# Patient Record
Sex: MALE | Race: WHITE | NOT HISPANIC OR LATINO | Employment: FULL TIME | URBAN - METROPOLITAN AREA
[De-identification: names, ages, dates, MRNs, and addresses within clinical notes are randomized per-mention and may not be internally consistent; named-entity substitution may affect disease eponyms.]

---

## 2017-05-26 ENCOUNTER — NON-PROVIDER VISIT (OUTPATIENT)
Dept: URGENT CARE | Facility: CLINIC | Age: 32
End: 2017-05-26

## 2017-05-26 ENCOUNTER — OFFICE VISIT (OUTPATIENT)
Dept: URGENT CARE | Facility: CLINIC | Age: 32
End: 2017-05-26

## 2017-05-26 DIAGNOSIS — Z01.89 RESPIRATORY CLEARANCE EXAMINATION, ENCOUNTER FOR: ICD-10-CM

## 2017-05-26 PROCEDURE — 94375 RESPIRATORY FLOW VOLUME LOOP: CPT | Performed by: PHYSICIAN ASSISTANT

## 2017-05-26 NOTE — MR AVS SNAPSHOT
Jose Luong   2017 1:15 PM   Appointment   MRN: [id number]    Department:  [facility]   Dept Phone:  [phone]    Description:  Male : 1985   Provider:  Ashley Catalan PA-C           Allergies as of 2017     No Known Allergies      Vital Signs     Smoking Status                   Never Smoker            Basic Information     Date Of Birth Sex Race Ethnicity Preferred Language    1985 Male White Non- English      Health Maintenance        Date Due Completion Dates    IMM DTaP/Tdap/Td Vaccine (1 - Tdap) 2005 ---            Current Immunizations     No immunizations on file.      Below and/or attached are the medications your provider expects you to take. Review all of your home medications and newly ordered medications with your provider and/or pharmacist. Follow medication instructions as directed by your provider and/or pharmacist. Please keep your medication list with you and share with your provider. Update the information when medications are discontinued, doses are changed, or new medications (including over-the-counter products) are added; and carry medication information at all times in the event of emergency situations     Allergies:  No Known Allergies          Medications  Valid as of: May 26, 2017 -  1:21 PM    Generic Name Brand Name Tablet Size Instructions for use    Cyclobenzaprine HCl (Tab) FLEXERIL 10 MG Take 1 Tab by mouth 3 times a day as needed for Mild Pain.        Hydrocodone-Acetaminophen (Tab) NORCO 5-325 MG Take 1-2 Tabs by mouth every 6 hours as needed.        Hydrocodone-Acetaminophen (Tab) NORCO 5-325 MG Take 1-2 Tabs by mouth every 6 hours as needed.        Ondansetron (TABLET DISPERSIBLE) ZOFRAN ODT 4 MG Take 1 Tab by mouth every 8 hours as needed for Nausea/Vomiting.        Sertraline HCl (Tab) ZOLOFT 50 MG Take 1 Tab by mouth every day.        .                 Medicines prescribed today were sent to:     [facility]  [id number] - [place], [place] - [address]    [address] [place] [address]    Phone: [phone] Fax: [phone]    Open 24 Hours?: No      Medication refill instructions:       If your prescription bottle indicates you have medication refills left, it is not necessary to call your provider’s office. Please contact your pharmacy and they will refill your medication.    If your prescription bottle indicates you do not have any refills left, you may request refills at any time through one of the following ways: The online Palmer Hargreaves system (except Urgent Care), by calling your provider’s office, or by asking your pharmacy to contact your provider’s office with a refill request. Medication refills are processed only during regular business hours and may not be available until the next business day. Your provider may request additional information or to have a follow-up visit with you prior to refilling your medication.   *Please Note: Medication refills are assigned a new Rx number when refilled electronically. Your pharmacy may indicate that no refills were authorized even though a new prescription for the same medication is available at the pharmacy. Please request the medicine by name with the pharmacy before contacting your provider for a refill.           Palmer Hargreaves Access Code: [email or web address]  Expires: 6/25/2017  1:21 PM    Your email address is not on file at [facility].  Email Addresses are required for you to sign up for [employer], please contact [phone] to verify your personal information and to provide your email address prior to attempting to register for [employer].    Jose Luong  [address] [place] [address]    Palmer Hargreaves  A secure, online tool to manage your health information     [facility]’s Palmer Hargreaves® is a secure, online tool that connects you to your personalized health information from the privacy of your home -- day or night - making it very easy for you to manage your healthcare. Once the  activation process is completed, you can even access your medical information using the InvestGlass fransisca, which is available for free in the Apple Fransisca store or Google Play store.     To learn more about InvestGlass, visit [email or web address]    There are two levels of access available (as shown below):   My Chart Features  [facility]  Urgent  Care Non-[facility] Doctor   Email your healthcare team securely and privately 24/7 X X X    Manage appointments: schedule your next appointment; view details of past/upcoming appointments X      Request prescription refills. X      View recent personal medical records, including lab and immunizations X X X X   View health record, including health history, allergies, medications X X X X   Read reports about your outpatient visits, procedures, consult and ER notes X X X X   See your discharge summary, which is a recap of your hospital and/or ER visit that includes your diagnosis, lab results, and care plan X X  X     How to register for InvestGlass:  Once your e-mail address has been verified, follow the following steps to sign up for InvestGlass.     1. Go to  [email or web address]  2. Click on the Sign Up Now box, which takes you to the New Member Sign Up page. You will need to provide the following information:  a. Enter your InvestGlass Access Code exactly as it appears at the top of this page. (You will not need to use this code after you’ve completed the sign-up process. If you do not sign up before the expiration date, you must request a new code.)   b. Enter your date of birth.   c. Enter your home email address.   d. Click Submit, and follow the next screen’s instructions.  3. Create a Spoket ID. This will be your InvestGlass login ID and cannot be changed, so think of one that is secure and easy to remember.  4. Create a InvestGlass password. You can change your password at any time.  5. Enter your Password Reset Question and Answer.  This can be used at a later time if you forget your password.   6. Enter your e-mail address. This allows you to receive e-mail notifications when new information is available in [employer].  7. Click Sign Up. You can now view your health information.    For assistance activating your [employer] account, call [phone]

## 2017-08-07 ENCOUNTER — OFFICE VISIT (OUTPATIENT)
Dept: INTERNAL MEDICINE | Facility: MEDICAL CENTER | Age: 32
End: 2017-08-07
Payer: COMMERCIAL

## 2017-08-07 VITALS
SYSTOLIC BLOOD PRESSURE: 118 MMHG | DIASTOLIC BLOOD PRESSURE: 80 MMHG | TEMPERATURE: 97.2 F | WEIGHT: 216 LBS | BODY MASS INDEX: 31.99 KG/M2 | HEIGHT: 69 IN | HEART RATE: 77 BPM | OXYGEN SATURATION: 95 %

## 2017-08-07 DIAGNOSIS — Z13.1 SCREENING FOR DIABETES MELLITUS (DM): ICD-10-CM

## 2017-08-07 DIAGNOSIS — E66.9 OBESITY (BMI 30-39.9): ICD-10-CM

## 2017-08-07 DIAGNOSIS — Z83.3 FAMILY HISTORY OF DIABETES MELLITUS (DM): ICD-10-CM

## 2017-08-07 DIAGNOSIS — F32.A DEPRESSION, UNSPECIFIED DEPRESSION TYPE: ICD-10-CM

## 2017-08-07 PROCEDURE — 99204 OFFICE O/P NEW MOD 45 MIN: CPT | Mod: GC | Performed by: INTERNAL MEDICINE

## 2017-08-07 RX ORDER — FLUOXETINE HYDROCHLORIDE 20 MG/1
20 CAPSULE ORAL DAILY
Qty: 30 CAP | Refills: 2 | Status: SHIPPED | OUTPATIENT
Start: 2017-08-07 | End: 2017-11-06 | Stop reason: SDUPTHER

## 2017-08-07 ASSESSMENT — PATIENT HEALTH QUESTIONNAIRE - PHQ9
SUM OF ALL RESPONSES TO PHQ QUESTIONS 1-9: 20
5. POOR APPETITE OR OVEREATING: 1 - SEVERAL DAYS
CLINICAL INTERPRETATION OF PHQ2 SCORE: 5

## 2017-08-07 NOTE — PROGRESS NOTES
"New Patient to Establish    Reason to establish: New patient to establish    CC: establish care and depression.     HPI: Patient notes new depression for about 2 years.  Prior to the onset, he had both his grand-parents pass away shortly before this (and they were the ones to raise him).  He does not note any SI at present, and notes that he wants to stick around for his kid (a 20 month old daughter).  He is 6/8 + for SIGECAPS, with self-acknowledged depression.  He states that he always just stays at home, but then looses interest at those activities too (and gets bored easily),  He feels he is a failure / not a success, he is tired all the time, has poor sleep quality (\"horrible\" low-quality sleep, easily awoken / tosses-and-turns), has poor focus / concentration (ongoing for several years, possibly decade), and he typically gains weight and eats a lot (but recently on a diet, and loss of 15 lbs, intentionally - making effort on diet).  He denies current SI, and is \"able to get-up-and-go with new job,\" but notes that it has previously been a problem (in the past).     (no insomnia, no risk taking, no manic behavior).  His PHQ 2 score is 5, and his PHQ 9 score is 20.    He has previously had depresison in his early 20 years (aver 10 years ago).  He does not remember any cause or trigger to this, but does confirm past issues with SI.  Before, he had tried a \"name-brand\" medication (he doesn't remember which), which appeared to help, but that he couldn't afford it.  They then switched to a generic version, and it didn't seem to help as much. Eventually he switched again, without effect, and then stopped taking medication.        Migraines - pulsing - front and sides, has to lie down, +photophobia, not phonophobia.  Often when sleeping, awakens from sleep.   About once in 3 months.  He thinks it is related to dehydration (since reports that he only drinks about 3 glasses of water in a day).      About once a year, he " "also notices an odd sensation described as an \"adrenalin heartbeat\" - like he just got off roller-coaster, but denies anxiety with it.  \"just weird\" feeling - for about 1 minute.     Leg-cramps when running (occassionally walking) - in calfs. Seems to help if he eats bananas (potassium).  Worse when going up stairs (after about 3 flights of stairs).       Patient Active Problem List    Diagnosis Date Noted   • Obesity (BMI 30-39.9) 08/07/2017   • Depression 08/07/2017       Past Medical History   Diagnosis Date   • Kidney stone 2013   • Ankle joint pain 2010     tendon problem with left ankle. (declined surgery).      History reviewed. No pertinent past surgical history.      Home Meds  No current medications.       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        Family History   Problem Relation Age of Onset   • Depression Mother    • Stroke Maternal Grandmother    • Cancer Maternal Grandfather    • Diabetes Maternal Grandfather    • Heart Disease Paternal Grandfather    • Stroke Paternal Grandfather    • Diabetes Maternal Aunt          ROS: As per HPI. Additional pertinent symptoms as noted below.    Constitutional: Notes he is tired and depressed, as per HPI.   Cardiovascular: denies CP or orthopnea, good distal pulses in legs.   Respiratory: No SOB or LAMBERT  GI: no nausea/vomiting, or diarrhea/constipation.  : Past history of kidney stone, no recent complaints or pains.   Musculo-skeletal: has stiff left ankle (previously told " "tendon problem / declined surgery).      Also has occassional calf pain/ache when running or going up several flights of stairs.   Skin: no rashes or itching  Neurological: no focal motor deficit or change in sensation  Psychological: feels depressed (see HPI).     /80 mmHg  Pulse 77  Temp(Src) 36.2 °C (97.1 °F)  Ht 1.753 m (5' 9\")  Wt 97.977 kg (216 lb)  BMI 31.88 kg/m2  SpO2 95%    Physical Exam  General:  Alert and oriented, No apparent distress. Appears tired.   Eyes: Pupils equal and reactive. No scleral icterus.  Throat: Clear no erythema or exudates noted.  Neck: Supple. No lymphadenopathy noted. Thyroid not enlarged.  Lungs: Clear to auscultation and percussion bilaterally.  Cardiovascular: Regular rate and rhythm. No murmurs, rubs or gallops.  Abdomen:  Benign. No rebound or guarding noted. +BS, soft.  Non-tender.  Extremities: No clubbing, cyanosis, edema.  Normal hair on legs.  Good distal pulses bilaterally.   Skin: Clear. No rash or suspicious skin lesions noted.  Multiple tattoos.  Psych: Depressed presentation with 6/8-SIGECAPS, and PHQ-2 score of 5, and PHQ-9 score of 20.      Assessment and Plan    Depression  -6/8-SIGECAPS, and PHQ-2 score of 5, and PHQ-9 score of 20.    -starting on fluoxetine (Prozac) 20, daily.   Based on SE profile, he feels this will work well for him     Patient was presented with the benefit and side-effect profiles for several anti-depressants, and assisted in choosing his medication.     (he is not concerned about potential sexual side effects, but may need to reevaluate in future, if this becomes an issue).   -referring to psychology for counseling, regarding his depression.    -also ordering a TSH (with reflex T4), given depression and obesity.     Obesity  -BMI = 31.9.  -has been on diet recently, and lost 15 lbs, in about 2-3 months.   -recommend continuing with diet and start a graded exercise program.    (instructions provided for basic graded exercise " program).    (also advised to ensure he has proper footwear during exercise).   -also ordering a TSH (with reflex T4), given depression and obesity.     Diabetic screening and family history of DM  -getting A1C and CMP to evaluate, given his obesity and family history.       Risk Assessment (discuss potential complications a function of chronic problems):   He has previously been told about the risks of depression (and is choosing to seek treatment).   He is at increased weight for various conditions, with obesity.  His BP was normal.  Getting screen for diabetes.    Complexity (discuss number of co-morbidities):   Given history of both depression and obesity, a TSH is being obtained.   Also took the time to present options for the side-effect profile for various antidepressants, since some could worsen his other issues.   He is also being screened for DM.        Signed by: Christofer Sykes M.D.

## 2017-08-07 NOTE — PATIENT INSTRUCTIONS
Please take fluoxetine (Prozac) 20 mg, once daily.  (Sent to your pharmacy).   Please stop by the lab in the morning (before eating) for some blood work (within the next week or so).   Please follow-up with this clinic in about 4 weeks (for depression).

## 2017-08-17 ENCOUNTER — OFFICE VISIT (OUTPATIENT)
Dept: URGENT CARE | Facility: CLINIC | Age: 32
End: 2017-08-17
Payer: COMMERCIAL

## 2017-08-17 VITALS
SYSTOLIC BLOOD PRESSURE: 107 MMHG | HEART RATE: 105 BPM | BODY MASS INDEX: 30.81 KG/M2 | TEMPERATURE: 98.4 F | HEIGHT: 69 IN | DIASTOLIC BLOOD PRESSURE: 72 MMHG | WEIGHT: 208 LBS | OXYGEN SATURATION: 96 % | RESPIRATION RATE: 14 BRPM

## 2017-08-17 DIAGNOSIS — J06.9 VIRAL URI WITH COUGH: ICD-10-CM

## 2017-08-17 PROCEDURE — 99214 OFFICE O/P EST MOD 30 MIN: CPT | Performed by: NURSE PRACTITIONER

## 2017-08-17 ASSESSMENT — ENCOUNTER SYMPTOMS
FEVER: 1
WHEEZING: 0
SPUTUM PRODUCTION: 0
PALPITATIONS: 0
HEADACHES: 0
ORTHOPNEA: 0
WEAKNESS: 0
HEMOPTYSIS: 0
COUGH: 1
SORE THROAT: 1
CHILLS: 1
MYALGIAS: 1
DIAPHORESIS: 0
SHORTNESS OF BREATH: 0
NECK PAIN: 0
BACK PAIN: 0

## 2017-08-17 ASSESSMENT — PAIN SCALES - GENERAL: PAINLEVEL: 2=MINIMAL-SLIGHT

## 2017-08-17 NOTE — MR AVS SNAPSHOT
"        Jose Luong   2017 1:15 PM   Office Visit   MRN: [id number]    Department:  [facility]   Dept Phone:  [phone]    Description:  Male : 1985   Provider:  MARGARITA Caro           Reason for Visit     Flu Like Symptoms cough, congestion, diarrhea, cold chills, sweating x 4 days      Allergies as of 2017     No Known Allergies      You were diagnosed with     Viral URI with cough   [[id number]]         Vital Signs     Blood Pressure Pulse Temperature Respirations Height Weight    107/72 mmHg 105 36.9 °C (98.4 °F) 14 1.76 m (5' 9.29\") 94.348 kg (208 lb)    Body Mass Index Oxygen Saturation Smoking Status             30.46 kg/m2 96% Never Smoker          Basic Information     Date Of Birth Sex Race Ethnicity Preferred Language    1985 Male White Non- English      Your appointments     Sep 07, 2017  1:15 PM   Established Patient with Christofer Sykes M.D.   [facility] - Internal Medicine (--)    [address] [place] [id number] [phone]           You will be receiving a confirmation call a few days before your appointment from our automated call confirmation system.            Oct 06, 2017 10:30 AM   Initial Behavioral Health Eval with Brenda Young L.C.S.W.   BEHAVIORAL HEALTH [facility])    [address] [place] [address] [address]  [facility] [id number] [phone]              Problem List              ICD-10-CM Priority Class Noted - Resolved    Obesity (BMI 30-39.9) E66.9   2017 - Present    Depression F32.9   2017 - Present      Health Maintenance        Date Due Completion Dates    IMM DTaP/Tdap/Td Vaccine (1 - Tdap) 2005 ---    IMM INFLUENZA (1) 2017 ---            Current Immunizations     No immunizations on file.      Below and/or attached are the medications your provider expects you to take. Review all of your home medications and newly ordered medications with your provider and/or pharmacist. " Follow medication instructions as directed by your provider and/or pharmacist. Please keep your medication list with you and share with your provider. Update the information when medications are discontinued, doses are changed, or new medications (including over-the-counter products) are added; and carry medication information at all times in the event of emergency situations     Allergies:  No Known Allergies          Medications  Valid as of: August 17, 2017 -  1:43 PM    Generic Name Brand Name Tablet Size Instructions for use    FLUoxetine HCl (Cap) PROZAC 20 MG Take 1 Cap by mouth every day.        .                 Medicines prescribed today were sent to:     [facility] [address] [facility], [place] - [address]    [address] [place] [address]    Phone: [phone] Fax: [phone]    Open 24 Hours?: No      Medication refill instructions:       If your prescription bottle indicates you have medication refills left, it is not necessary to call your provider’s office. Please contact your pharmacy and they will refill your medication.    If your prescription bottle indicates you do not have any refills left, you may request refills at any time through one of the following ways: The online [employer] system (except Urgent Care), by calling your provider’s office, or by asking your pharmacy to contact your provider’s office with a refill request. Medication refills are processed only during regular business hours and may not be available until the next business day. Your provider may request additional information or to have a follow-up visit with you prior to refilling your medication.   *Please Note: Medication refills are assigned a new Rx number when refilled electronically. Your pharmacy may indicate that no refills were authorized even though a new prescription for the same medication is available at the pharmacy. Please request the medicine by name with the pharmacy before contacting your  provider for a refill.           MyChart Access Code: Activation code not generated  Current MyChart Status: Active

## 2017-08-17 NOTE — Clinical Note
August 17, 2017         Patient: Jose Luong   YOB: 1985   Date of Visit: 8/17/2017           To Whom it May Concern:    Jose Luong was seen in my clinic on 8/17/2017.  Please excuse him from work today and tomorrow as he recovers from acute illness.  He may return at his next scheduled shift after 8/18/17.    If you have any questions or concerns, please don't hesitate to call.        Sincerely,           RICARDO Caro.  Electronically Signed

## 2017-08-17 NOTE — PROGRESS NOTES
"Subjective:      Jose Luong is a 32 y.o. male who presents with Flu Like Symptoms            HPI   Patient comes in today with a 4 day history of intermittent low grade fever, chills, myalgias, nasal congestion, sore throat, dry cough, and general malaise.  He noted one episode of diarrhea on day 2.  He is taking OTC cold medication with good short term relief of symptoms.  No shortness of breath or chest pain.  No ill household contacts.     Review of Systems   Constitutional: Positive for fever, chills and malaise/fatigue. Negative for diaphoresis.   HENT: Positive for congestion and sore throat. Negative for ear pain.    Respiratory: Positive for cough. Negative for hemoptysis, sputum production, shortness of breath and wheezing.    Cardiovascular: Negative for chest pain, palpitations and orthopnea.   Musculoskeletal: Positive for myalgias. Negative for back pain, joint pain and neck pain.   Skin: Negative for rash.   Neurological: Negative for weakness and headaches.     Medications, Allergies, and current problem list reviewed today in Epic     Objective:     /72 mmHg  Pulse 105  Temp(Src) 36.9 °C (98.4 °F)  Resp 14  Ht 1.76 m (5' 9.29\")  Wt 94.348 kg (208 lb)  BMI 30.46 kg/m2  SpO2 96%     Physical Exam   Constitutional: He is oriented to person, place, and time. He appears well-developed and well-nourished. No distress.   Patient appears fatigued but non-toxic.   HENT:   Head: Normocephalic.   Mouth/Throat: Oropharynx is clear and moist. No oropharyngeal exudate.   Nares patent.  Clear nasal drainage.  NO sinus pain.  Bilateral clear middle ear effusion.  NO erythema, purulence, retraction, or bulge.    Eyes: Conjunctivae are normal. Pupils are equal, round, and reactive to light. Right eye exhibits no discharge. Left eye exhibits no discharge. No scleral icterus.   Neck: Normal range of motion. Neck supple. No JVD present. No tracheal deviation present. No thyromegaly present. "   Cardiovascular: Regular rhythm and normal heart sounds.  Exam reveals no gallop and no friction rub.    No murmur heard.  Tachycardia noted.   Pulmonary/Chest: Effort normal and breath sounds normal. No stridor. No respiratory distress. He has no wheezes. He has no rales. He exhibits no tenderness.   Minimal dry cough in clinic.   Musculoskeletal: He exhibits no edema.   Lymphadenopathy:     He has no cervical adenopathy.   Neurological: He is alert and oriented to person, place, and time.   Skin: Skin is warm and dry. He is not diaphoretic. No erythema. No pallor.   Vitals reviewed.              Assessment/Plan:     1. Viral URI with cough    Advised patient that based on the history and exam findings, this is likely a self-limiting viral illness.  There is no indication for antibiotics at this time.  OTC NSAIDs or tylenol prn fever, pain.  OTC cold medications prn symptom management.  Patient declined tessalon.  Maintain adequate po hydration.  RTC in 5-7 days if symptoms persist, sooner if worse.  Patient verbalized understanding of and agreed with plan of care.

## 2017-09-07 ENCOUNTER — OFFICE VISIT (OUTPATIENT)
Dept: INTERNAL MEDICINE | Facility: MEDICAL CENTER | Age: 32
End: 2017-09-07
Payer: COMMERCIAL

## 2017-09-07 VITALS
HEIGHT: 69 IN | TEMPERATURE: 97.9 F | DIASTOLIC BLOOD PRESSURE: 58 MMHG | HEART RATE: 61 BPM | BODY MASS INDEX: 30.21 KG/M2 | WEIGHT: 204 LBS | SYSTOLIC BLOOD PRESSURE: 119 MMHG | OXYGEN SATURATION: 97 %

## 2017-09-07 DIAGNOSIS — E66.9 OBESITY (BMI 30-39.9): ICD-10-CM

## 2017-09-07 DIAGNOSIS — F32.A DEPRESSION, UNSPECIFIED DEPRESSION TYPE: ICD-10-CM

## 2017-09-07 PROCEDURE — 99214 OFFICE O/P EST MOD 30 MIN: CPT | Mod: GC | Performed by: INTERNAL MEDICINE

## 2017-09-07 ASSESSMENT — ENCOUNTER SYMPTOMS
FOCAL WEAKNESS: 0
WEAKNESS: 0
COUGH: 0
SENSORY CHANGE: 0
DIARRHEA: 0
ABDOMINAL PAIN: 0
VOMITING: 0
WEIGHT LOSS: 1
CLAUDICATION: 0
EYE PAIN: 0
PALPITATIONS: 0
FEVER: 0
BRUISES/BLEEDS EASILY: 0
MYALGIAS: 0
TINGLING: 0
SHORTNESS OF BREATH: 0
NAUSEA: 0
HEARTBURN: 0
CONSTIPATION: 0
BACK PAIN: 0
CHILLS: 0
BLURRED VISION: 0
DIAPHORESIS: 0

## 2017-09-07 ASSESSMENT — PATIENT HEALTH QUESTIONNAIRE - PHQ9
CLINICAL INTERPRETATION OF PHQ2 SCORE: 2
SUM OF ALL RESPONSES TO PHQ QUESTIONS 1-9: 8
5. POOR APPETITE OR OVEREATING: 0 - NOT AT ALL

## 2017-09-07 ASSESSMENT — LIFESTYLE VARIABLES: SUBSTANCE_ABUSE: 0

## 2017-09-07 ASSESSMENT — PAIN SCALES - GENERAL: PAINLEVEL: NO PAIN

## 2017-09-07 NOTE — PATIENT INSTRUCTIONS
Please have the labs done within the next week or two.   Please follow-up in 1 month.   Continue to focus on diet and exercise (you've been doing great).

## 2017-09-07 NOTE — PROGRESS NOTES
Established Patient  CC:  1 month follow-up - for Depression.    HPI: Jose Luong is a 32 y.o. Male, who is here for a follow-up visit for his depression. He was seen one month ago, and started on fluoxetine at that time.  He states that he has been better since starting fluoxetine.  He doesn't loose his temper as often, and has been more relaxed and happier than before.  Also not over-thinking things as much as before.   His PHQ-9 score was only 8 today (which is a significant improvement from last month, when it was 20).      The patient has recently been dieting. He has decreased his sugar intake, and sodas.  He has moderated his diet.    He is still waiting before starting a exercise routine, as he wants to focus on diet first.   In the past month, he has focused on his diet, and lost 12 lbs.!        Additionally, since his last visit, a month ago, he has also had an URTI for about 1 week, 3 weeks ago.  He was seen at a [facility] for this, and told to rest and stay hydrated and use NSAIDs or Tylenol, as needed (as it was at the end of the cold / flu).  He said this has resolved well, without further problems or complications.     Additionally, he has noticed improvement in his prior leg cramping (which he had noted to improve with bananas / potassium), since he has been losing weight.  And now he is able to walk around at work much easier than before, and does better on the stairs.        PHQ-9:  Depression Screening  Little interest or pleasure in doing things?  1 - several days  Feeling down, depressed , or hopeless? 1 - several days  Trouble falling or staying asleep, or sleeping too much?  3 - nearly every day  Feeling tired or having little energy?  1 - several days  Poor appetite or overeating?  0 - not at all  Feeling bad about yourself - or that you are a failure or have let yourself or your family down? 1 - several days  Trouble concentrating on things, such as reading the newspaper  or watching television? 1 - several days  Moving or speaking so slowly that other people could have noticed.  Or the opposite - being so fidgety or restless that you have been moving around a lot more than usual?  0 - not at all  Thoughts that you would be better off dead, or of hurting yourself?  0 - not at all  Patient Health Questionnaire Score: 8  Interpretation of PHQ-9 Total Score   5-9 Mild Depression   This has improved from a score of 20, on his prior visit.      ROS: As per HPI. Additional pertinent symptoms as noted below.  Review of Systems   Constitutional: Positive for weight loss. Negative for chills, diaphoresis, fever and malaise/fatigue.        Intentional weight loss of 12 pounds in past month, while on a diet.    HENT: Negative for congestion and hearing loss.         Only notes occasional headaches, if he gets dehydrated.   Eyes: Negative for blurred vision and pain.   Respiratory: Negative for cough and shortness of breath.    Cardiovascular: Negative for chest pain, palpitations, claudication and leg swelling.   Gastrointestinal: Negative for abdominal pain, constipation, diarrhea, heartburn, nausea and vomiting.   Genitourinary: Negative for dysuria.   Musculoskeletal: Negative for back pain and myalgias.   Skin: Negative for itching and rash.   Neurological: Negative for tingling, sensory change, focal weakness and weakness.   Endo/Heme/Allergies: Does not bruise/bleed easily.   Psychiatric/Behavioral: Negative for substance abuse and suicidal ideas.        Improved depression, as noted in HPI.  He also notes occasional episodes of difficulty sleeping; however, much of this is secondary to his 20 month old daughter waking him up at night.  But, he also notes occasional episodes of difficulty sleeping, even one that does not happen.       Past Medical History:   Diagnosis Date   • Kidney stone 2013   • Ankle joint pain 2010    tendon problem with left ankle. (declined surgery).      No past  "surgical history on file. / Denies past surgeries.       Family History   Problem Relation Age of Onset   • Depression Mother    • Stroke Maternal Grandmother    • Cancer Maternal Grandfather    • Diabetes Maternal Grandfather    • Heart Disease Paternal Grandfather    • Stroke Paternal Grandfather    • Diabetes Maternal Aunt        Social History     Social History   • Marital status: Single     Spouse name: N/A   • Number of children: N/A   • Years of education: N/A     Occupational History   • Not on file.     Social History Main Topics   • Smoking status: Never Smoker   • Smokeless tobacco: Never Used   • Alcohol use Yes      Comment: 1-2 per month   • Drug use: No   • Sexual activity: Yes     Partners: Female     Other Topics Concern   • Not on file     Social History Narrative   • No narrative on file         Current Outpatient Prescriptions   Medication Sig Dispense Refill   • fluoxetine (PROZAC) 20 MG Cap Take 1 Cap by mouth every day. 30 Cap 2     No current facility-administered medications for this visit.          Physical Exam  /58   Pulse 61   Temp 36.6 °C (97.9 °F)   Ht 1.759 m (5' 9.25\")   Wt 92.5 kg (204 lb)   SpO2 97%   BMI 29.91 kg/m²  (improved)    Physical Exam   Constitutional: He is oriented to person, place, and time. He appears well-developed and well-nourished.   HENT:   Head: Normocephalic and atraumatic.   Eyes: EOM are normal. Pupils are equal, round, and reactive to light.   Neck: No tracheal deviation present.   Cardiovascular: Normal rate, regular rhythm and normal heart sounds.    No murmur heard.  Pulmonary/Chest: Effort normal. No stridor. No respiratory distress. He has no wheezes. He has no rales.   Abdominal: Soft. Bowel sounds are normal. He exhibits no distension. There is no tenderness.   Musculoskeletal: He exhibits no edema or tenderness.   Lymphadenopathy:     He has no cervical adenopathy.   Neurological: He is alert and oriented to person, place, and time. "   Skin: Skin is warm and dry.   Psychiatric: He has a normal mood and affect. His behavior is normal.         Assessment and Plan    Depression. - improved / controlled.  PHQ-9 score has improved from 20, to 8, in the past month, that he has been on fluoxetine.  He is currently on 20 mg per day, and his symptoms appear well controlled on this dose.  Some research indicates that the higher dose would not be indicated, given his improvement.  Will continue on his current dose.    Overweight.  (Previously obesity).  Patient's BMI has improved from 31.9 (obese), to 29.9 (overweight).   He has lost 12 pounds in the past month, due to diet.!  Recommended continuing on diet at this time, and gradually beginning of very mild form of exercise routine.  At this time, he wishes to hold off on any strenuous exercise, until after she makes more improvement with diet. This is acceptable at this time. However, I noted that he may wish to occasionally begin a very mild form of exercise, and intermittent periods. So that, when he begins his exercise routine, he will not have to start from such a degree of deconditioning.  He is agreeable to this.    General Medical Exam.  After his last visit, he did not recall having the lab slips, so he did not have the basic labs done after his last visit. These were reprinted for him, and he will follow-up with those tests in the next week or two.  He will have a CBC, CMP, TSH with reflex T4, and A1c.  He will follow up in 4 or 5 weeks, to further assess his depression, and to make sure he is responding well.      Parts of this note were created with a computerized dictation system.    Despite review, there may be some spelling or grammatical errors.    Signed by: Christofer Sykes M.D.

## 2017-09-18 ENCOUNTER — HOSPITAL ENCOUNTER (OUTPATIENT)
Dept: LAB | Facility: MEDICAL CENTER | Age: 32
End: 2017-09-18
Attending: STUDENT IN AN ORGANIZED HEALTH CARE EDUCATION/TRAINING PROGRAM
Payer: COMMERCIAL

## 2017-09-18 DIAGNOSIS — Z13.1 SCREENING FOR DIABETES MELLITUS (DM): ICD-10-CM

## 2017-09-18 DIAGNOSIS — Z83.3 FAMILY HISTORY OF DIABETES MELLITUS (DM): ICD-10-CM

## 2017-09-18 DIAGNOSIS — F32.A DEPRESSION, UNSPECIFIED DEPRESSION TYPE: ICD-10-CM

## 2017-09-18 DIAGNOSIS — E66.9 OBESITY (BMI 30-39.9): ICD-10-CM

## 2017-09-18 LAB
ALBUMIN SERPL BCP-MCNC: 4.8 G/DL (ref 3.2–4.9)
ALBUMIN/GLOB SERPL: 1.9 G/DL
ALP SERPL-CCNC: 63 U/L (ref 30–99)
ALT SERPL-CCNC: 49 U/L (ref 2–50)
ANION GAP SERPL CALC-SCNC: 8 MMOL/L (ref 0–11.9)
AST SERPL-CCNC: 22 U/L (ref 12–45)
BASOPHILS # BLD AUTO: 0.4 % (ref 0–1.8)
BASOPHILS # BLD: 0.03 K/UL (ref 0–0.12)
BILIRUB SERPL-MCNC: 0.6 MG/DL (ref 0.1–1.5)
BUN SERPL-MCNC: 13 MG/DL (ref 8–22)
CALCIUM SERPL-MCNC: 9.4 MG/DL (ref 8.5–10.5)
CHLORIDE SERPL-SCNC: 106 MMOL/L (ref 96–112)
CO2 SERPL-SCNC: 25 MMOL/L (ref 20–33)
CREAT SERPL-MCNC: 0.91 MG/DL (ref 0.5–1.4)
EOSINOPHIL # BLD AUTO: 0.05 K/UL (ref 0–0.51)
EOSINOPHIL NFR BLD: 0.7 % (ref 0–6.9)
ERYTHROCYTE [DISTWIDTH] IN BLOOD BY AUTOMATED COUNT: 42.8 FL (ref 35.9–50)
EST. AVERAGE GLUCOSE BLD GHB EST-MCNC: 100 MG/DL
GFR SERPL CREATININE-BSD FRML MDRD: >60 ML/MIN/1.73 M 2
GLOBULIN SER CALC-MCNC: 2.5 G/DL (ref 1.9–3.5)
GLUCOSE SERPL-MCNC: 93 MG/DL (ref 65–99)
HBA1C MFR BLD: 5.1 % (ref 0–5.6)
HCT VFR BLD AUTO: 46.6 % (ref 42–52)
HGB BLD-MCNC: 16.1 G/DL (ref 14–18)
IMM GRANULOCYTES # BLD AUTO: 0.02 K/UL (ref 0–0.11)
IMM GRANULOCYTES NFR BLD AUTO: 0.3 % (ref 0–0.9)
LYMPHOCYTES # BLD AUTO: 1.34 K/UL (ref 1–4.8)
LYMPHOCYTES NFR BLD: 20 % (ref 22–41)
MCH RBC QN AUTO: 31.9 PG (ref 27–33)
MCHC RBC AUTO-ENTMCNC: 34.5 G/DL (ref 33.7–35.3)
MCV RBC AUTO: 92.3 FL (ref 81.4–97.8)
MONOCYTES # BLD AUTO: 0.41 K/UL (ref 0–0.85)
MONOCYTES NFR BLD AUTO: 6.1 % (ref 0–13.4)
NEUTROPHILS # BLD AUTO: 4.85 K/UL (ref 1.82–7.42)
NEUTROPHILS NFR BLD: 72.5 % (ref 44–72)
NRBC # BLD AUTO: 0 K/UL
NRBC BLD AUTO-RTO: 0 /100 WBC
PLATELET # BLD AUTO: 219 K/UL (ref 164–446)
PMV BLD AUTO: 11.1 FL (ref 9–12.9)
POTASSIUM SERPL-SCNC: 4.3 MMOL/L (ref 3.6–5.5)
PROT SERPL-MCNC: 7.3 G/DL (ref 6–8.2)
RBC # BLD AUTO: 5.05 M/UL (ref 4.7–6.1)
SODIUM SERPL-SCNC: 139 MMOL/L (ref 135–145)
TSH SERPL DL<=0.005 MIU/L-ACNC: 1.98 UIU/ML (ref 0.3–3.7)
WBC # BLD AUTO: 6.7 K/UL (ref 4.8–10.8)

## 2017-09-18 PROCEDURE — 84443 ASSAY THYROID STIM HORMONE: CPT

## 2017-09-18 PROCEDURE — 36415 COLL VENOUS BLD VENIPUNCTURE: CPT

## 2017-09-18 PROCEDURE — 80053 COMPREHEN METABOLIC PANEL: CPT

## 2017-09-18 PROCEDURE — 83036 HEMOGLOBIN GLYCOSYLATED A1C: CPT

## 2017-09-18 PROCEDURE — 85025 COMPLETE CBC W/AUTO DIFF WBC: CPT

## 2017-09-26 ENCOUNTER — OFFICE VISIT (OUTPATIENT)
Dept: URGENT CARE | Facility: CLINIC | Age: 32
End: 2017-09-26
Payer: COMMERCIAL

## 2017-09-26 VITALS
TEMPERATURE: 98.2 F | RESPIRATION RATE: 16 BRPM | BODY MASS INDEX: 30.13 KG/M2 | WEIGHT: 203.4 LBS | DIASTOLIC BLOOD PRESSURE: 74 MMHG | HEIGHT: 69 IN | SYSTOLIC BLOOD PRESSURE: 110 MMHG | HEART RATE: 87 BPM | OXYGEN SATURATION: 98 %

## 2017-09-26 DIAGNOSIS — J02.9 ACUTE PHARYNGITIS, UNSPECIFIED ETIOLOGY: Primary | ICD-10-CM

## 2017-09-26 LAB
INT CON NEG: NORMAL
INT CON POS: NORMAL
S PYO AG THROAT QL: NORMAL

## 2017-09-26 PROCEDURE — 99214 OFFICE O/P EST MOD 30 MIN: CPT | Performed by: NURSE PRACTITIONER

## 2017-09-26 PROCEDURE — 87880 STREP A ASSAY W/OPTIC: CPT | Performed by: NURSE PRACTITIONER

## 2017-09-26 RX ORDER — AMOXICILLIN 500 MG/1
500 CAPSULE ORAL 2 TIMES DAILY
Qty: 20 CAP | Refills: 0 | Status: SHIPPED | OUTPATIENT
Start: 2017-09-26 | End: 2017-11-16

## 2017-09-26 ASSESSMENT — ENCOUNTER SYMPTOMS
TROUBLE SWALLOWING: 1
DIARRHEA: 0
SHORTNESS OF BREATH: 0
COUGH: 0
MYALGIAS: 1
VOMITING: 0
FEVER: 0
WEAKNESS: 0
SPUTUM PRODUCTION: 0
SWOLLEN GLANDS: 1
STRIDOR: 0
SORE THROAT: 1
NAUSEA: 0
CHILLS: 0

## 2017-09-26 NOTE — PATIENT INSTRUCTIONS

## 2017-09-26 NOTE — LETTER
September 26, 2017         Patient: Jose Luong   YOB: 1985   Date of Visit: 9/26/2017           To Whom it May Concern:    Jose Luong was seen in my clinic on 9/26/2017. He should be off work for 2-3 days due to illness.     If you have any questions or concerns, please don't hesitate to call.        Sincerely,           KRISTEN Palacios.  Electronically Signed

## 2017-09-26 NOTE — PROGRESS NOTES
"Subjective:      Joes Luong is a 32 y.o. male who presents with Sinusitis (sinusitis/ sore throat since yesterday; work note please )            Medications, Allergies and Prior Medical Hx reviewed and [facility] in [facility]with patient/family today         Pharyngitis    This is a new problem. The current episode started yesterday. The problem has been gradually worsening. There has been no fever. The pain is severe. Associated symptoms include congestion, swollen glands and trouble swallowing. Pertinent negatives include no coughing, diarrhea, ear discharge, ear pain, shortness of breath, stridor or vomiting. He has had no exposure to strep. Treatments tried: otc cold meds. The treatment provided mild relief.       Review of Systems   Constitutional: Positive for malaise/fatigue. Negative for chills and fever.   HENT: Positive for congestion, sore throat and trouble swallowing. Negative for ear discharge and ear pain.    Respiratory: Negative for cough, sputum production, shortness of breath and stridor.    Gastrointestinal: Negative for diarrhea, nausea and vomiting.   Musculoskeletal: Positive for myalgias.   Neurological: Negative for weakness.          Objective:     /74   Pulse 87   Temp 36.8 °C (98.2 °F)   Resp 16   Ht 1.759 m (5' 9.25\")   Wt 92.3 kg (203 lb 6.4 oz)   SpO2 98%   BMI 29.82 kg/m²      Physical Exam   Constitutional: He appears well-developed and well-nourished. No distress.   HENT:   Head: Normocephalic and atraumatic.   Right Ear: Tympanic membrane and ear canal normal.   Left Ear: Tympanic membrane and ear canal normal.   Nose: Rhinorrhea present.   Mouth/Throat: Uvula is midline and mucous membranes are normal. No trismus in the jaw. No uvula swelling. Posterior oropharyngeal edema and posterior oropharyngeal erythema present. No oropharyngeal exudate.   Eyes: Conjunctivae are normal. Pupils are equal, round, and reactive to light.   Neck: Neck supple.   Cardiovascular: Normal " rate, regular rhythm and normal heart sounds.    Pulmonary/Chest: Effort normal and breath sounds normal. No respiratory distress. He has no wheezes.   Lymphadenopathy:     He has cervical adenopathy.   Neurological: He is alert.   Skin: Skin is warm and dry.   Psychiatric: He has a normal mood and affect. His behavior is normal.   Vitals reviewed.              Assessment/Plan:       1. Acute pharyngitis, unspecified etiology  amoxicillin (AMOXIL) 500 MG Cap       Rapid Strep - neg    Sx are compatible with strep discussed with pt he requesting abx.     Epic generated written imformation provided along with verbal instructions for  pharyngitis      Rest, Fluids, tylenol, ibuprofen, otc throat lozenges, gargle with warm salt water,   Pt will go to the ER for worsening or changing symptoms as discussed,  Follow-up with your primary care provider or return here if not improving in 5 days   Discharge instructions discussed with pt/family who verbalize understanding and agreement with poc

## 2017-10-06 ENCOUNTER — APPOINTMENT (OUTPATIENT)
Dept: BEHAVIORAL HEALTH | Facility: PHYSICIAN GROUP | Age: 32
End: 2017-10-06
Payer: COMMERCIAL

## 2017-10-09 ENCOUNTER — OFFICE VISIT (OUTPATIENT)
Dept: URGENT CARE | Facility: CLINIC | Age: 32
End: 2017-10-09
Payer: COMMERCIAL

## 2017-10-09 VITALS
HEART RATE: 69 BPM | WEIGHT: 203 LBS | RESPIRATION RATE: 16 BRPM | BODY MASS INDEX: 30.07 KG/M2 | OXYGEN SATURATION: 97 % | HEIGHT: 69 IN | TEMPERATURE: 97.8 F | DIASTOLIC BLOOD PRESSURE: 76 MMHG | SYSTOLIC BLOOD PRESSURE: 114 MMHG

## 2017-10-09 DIAGNOSIS — H10.32 ACUTE CONJUNCTIVITIS OF LEFT EYE, UNSPECIFIED ACUTE CONJUNCTIVITIS TYPE: ICD-10-CM

## 2017-10-09 PROCEDURE — 99214 OFFICE O/P EST MOD 30 MIN: CPT | Performed by: NURSE PRACTITIONER

## 2017-10-09 RX ORDER — POLYMYXIN B SULFATE AND TRIMETHOPRIM 1; 10000 MG/ML; [USP'U]/ML
1 SOLUTION OPHTHALMIC EVERY 4 HOURS
Qty: 10 ML | Refills: 0 | Status: SHIPPED | OUTPATIENT
Start: 2017-10-09 | End: 2017-10-16

## 2017-10-09 ASSESSMENT — ENCOUNTER SYMPTOMS
EYE PAIN: 0
COUGH: 0
HEADACHES: 0
DOUBLE VISION: 0
CHILLS: 0
BLURRED VISION: 0
EYE DISCHARGE: 1
SORE THROAT: 0
WEAKNESS: 0
FEVER: 0
EYE REDNESS: 1
PHOTOPHOBIA: 0
DIAPHORESIS: 0

## 2017-10-09 NOTE — LETTER
October 9, 2017         Patient: Jose Luong   YOB: 1985   Date of Visit: 10/9/2017           To Whom it May Concern:    Jose Luong was seen in my clinic on 10/9/2017. He may return to work after 24 hours of antibiotic use.    If you have any questions or concerns, please don't hesitate to call.        Sincerely,           RICARDO Martinez.  Electronically Signed

## 2017-10-09 NOTE — PROGRESS NOTES
"Subjective:      Jose Luong is a 32 y.o. male who presents with Conjunctivitis (right eye redness/ drainage since last Thursday )            Patient comes in today with a 4 day history of left eye redness with a matting purulence.  No eye pain or vision changes.  Denies any suspected foreign body, exposure to irritants, or eye trauma.  His young daughter recently had \"pink eye\".  Patient also had a recent URI and took a course of Amoxicillin from 9/26/17-9/5/17 with relief of symptoms.  No contact lens use.        Review of Systems   Constitutional: Negative for chills, diaphoresis, fever and malaise/fatigue.   HENT: Negative for congestion, ear pain and sore throat.    Eyes: Positive for discharge and redness. Negative for blurred vision, double vision, photophobia and pain.   Respiratory: Negative for cough.    Skin: Negative for rash.   Neurological: Negative for weakness and headaches.     Medications, Allergies, and current problem list reviewed today in Epic     Objective:     /76   Pulse 69   Temp 36.6 °C (97.8 °F)   Resp 16   Ht 1.759 m (5' 9.25\")   Wt 92.1 kg (203 lb)   SpO2 97%   BMI 29.76 kg/m²      Physical Exam   Constitutional: He is oriented to person, place, and time. He appears well-developed and well-nourished. No distress.   HENT:   Head: Normocephalic.   Right Ear: External ear normal.   Left Ear: External ear normal.   Nose: Nose normal.   Mouth/Throat: Oropharynx is clear and moist. No oropharyngeal exudate.   Eyes: EOM are normal. Pupils are equal, round, and reactive to light. Right eye exhibits no discharge. Left eye exhibits discharge. No scleral icterus.   Left conjunctiva is injected with a matting purulence noted on the lower lash line.  NO periorbital erythema, pain, or swelling.  Vision: 20/20 right; 20/20 left; 20/20 both.     Neck: Neck supple. No JVD present. No tracheal deviation present. No thyromegaly present.   Cardiovascular: Normal rate, regular rhythm and " normal heart sounds.  Exam reveals no gallop and no friction rub.    No murmur heard.  Pulmonary/Chest: Effort normal and breath sounds normal. No stridor. No respiratory distress. He has no wheezes. He has no rales. He exhibits no tenderness.   Musculoskeletal: He exhibits no edema.   Lymphadenopathy:     He has no cervical adenopathy.   Neurological: He is alert and oriented to person, place, and time.   Skin: Skin is warm and dry. He is not diaphoretic. No erythema. No pallor.   Vitals reviewed.              Assessment/Plan:     1. Acute conjunctivitis of left eye, unspecified acute conjunctivitis type  - polymixin-trimethoprim (POLYTRIM) 82768-1.1 UNIT/ML-% Solution; Place 1 Drop in left eye every 4 hours for 7 days.  Dispense: 10 mL; Refill: 0    Discussed with patient that conjunctivitis can be bacterial, viral, or allergic.  Will treat presuming a bacterial infection.  Advised that it is highly contagious and avoid touching face and eyes.  Clean any crusting or matting from eyes with a warm cloth and launder cloth before reuse.  Maintain adequate po hydration.  RTC in 2-3 days if symptoms do not improve, sooner if worse.  Patient verbalized understanding of and agreed with plan of care.

## 2017-10-18 ENCOUNTER — OFFICE VISIT (OUTPATIENT)
Dept: INTERNAL MEDICINE | Facility: MEDICAL CENTER | Age: 32
End: 2017-10-18
Payer: COMMERCIAL

## 2017-10-18 VITALS
BODY MASS INDEX: 30.51 KG/M2 | OXYGEN SATURATION: 96 % | WEIGHT: 206 LBS | HEART RATE: 85 BPM | HEIGHT: 69 IN | DIASTOLIC BLOOD PRESSURE: 79 MMHG | RESPIRATION RATE: 18 BRPM | TEMPERATURE: 97.9 F | SYSTOLIC BLOOD PRESSURE: 132 MMHG

## 2017-10-18 DIAGNOSIS — E66.9 OBESITY (BMI 30-39.9): ICD-10-CM

## 2017-10-18 DIAGNOSIS — F32.A DEPRESSION, UNSPECIFIED DEPRESSION TYPE: ICD-10-CM

## 2017-10-18 PROCEDURE — 99214 OFFICE O/P EST MOD 30 MIN: CPT | Mod: GC | Performed by: INTERNAL MEDICINE

## 2017-10-18 ASSESSMENT — PATIENT HEALTH QUESTIONNAIRE - PHQ9
SUM OF ALL RESPONSES TO PHQ QUESTIONS 1-9: 15
5. POOR APPETITE OR OVEREATING: 1 - SEVERAL DAYS
CLINICAL INTERPRETATION OF PHQ2 SCORE: 4

## 2017-10-18 NOTE — PROGRESS NOTES
Established Patient    Jose presents today (10/18/2017) with the following:    CC: Depression - follow-up.     HPI: Jose Luong is a 32 y.o., male, who presents for her follow-up on depression.     Patient was initially seen on 8/7/2017, for depression (which had been ongoing for about 2 years that time). He was then started on fluoxetine, which initially appeared to have good result. He was seen again on 9/7/2017, and had noted significant improvement in his depression. His PHQ-9 score had initially decreased from 20, down to 8. He had also noticed a decrease in his temper, and an increase in his happiness. He also noted he was able to move around more, walk more, and eat less. On his last visit, he also noted that he had lost 12 pounds on a diet (over about one month). However, since that time things have not gone as well.    He states that about 3 weeks ago, he was treated for pharyngitis with antibiotics, and then later for pinkeye.  He notes that when he was taking antibiotics, his mood changed, and he felt as though the antibiotics were preventing the fluoxetine from working properly. He noticed significant darkening of moods, and was not taking his fluoxetine regularly. He also began to eat more junk food, and walk less, during this time. He has since finished the antibiotic course (roughly 1-1/2 weeks ago), and has improved on his taking of fluoxetine, but still misses doses periodically. He states that he is better then when he was taking antibiotics, but still feels as though he is not as good as he was before taking antibiotics.     Additionally, he also notes that he has since been on vacation from work. And during that time he has eaten more junk food, then he regularly would have. However, he states that this was more to do with being on vacation, rather than a change in his moods.    He currently states that he is still finding it difficult to take his medications regularly, but is trying to  find a good schedule that will for better for him. He appears agreeable to trying to stay positive, and work for long-term goals, and improvement.    He states that he did previously try to go to counseling session; however, the counselor was called away that day for personal / family matter.  He was then rescheduled for next month, and states that he will follow up with this.         On today's visit, his weight is noted to be 3 pounds more than his last visit, and his BMI has increased from 29.82, to 30.42.    Today's Depression Screening  Little interest or pleasure in doing things?  1 - several days  Feeling down, depressed , or hopeless? 3 - nearly every day  Trouble falling or staying asleep, or sleeping too much?  3 - nearly every day  Feeling tired or having little energy?  3 - nearly every day  Poor appetite or overeating?  1 - several days  Feeling bad about yourself - or that you are a failure or have let yourself or your family down? 1 - several days  Trouble concentrating on things, such as reading the newspaper or watching television? 2 - more than half the days  Moving or speaking so slowly that other people could have noticed.  Or the opposite - being so fidgety or restless that you have been moving around a lot more than usual?  1 - several days  Thoughts that you would be better off dead, or of hurting yourself?  0 - not at all  Patient Health Questionnaire Score: 15    If depressive symptoms identified deferred to follow up visit unless specifically addressed in assesment and plan.  Interpretation of PHQ-9 Total Score   Score Severity   1-4 No Depression   5-9 Mild Depression   10-14 Moderate Depression   15-19 Moderately Severe Depression   20-27 Severe Depression    Today's PHQ-9 score, of 15, is a worsening from his last score of 8.   However it is still better than his original score of 20.        ROS  Review of Symptoms  GEN/CONST:   Denies fever, fatigue, weakness.  +weight gain (~ 3 lb in  "last month).   H/E:     Denies blurry vision or eye pain.  ENT:   Denies sinus pain, sore throat, ear pain, difficulty hearing, or tinnitus.  CARDIO:   Denies chest pain, palpitations, orthopnea, or edema.  RESP:   Denies shortness of breath, wheezing, or coughing.  GI:    Denies nausea, vomiting, constipation, or abdominal pain.   +mild diarrhea, depending on which (greesy / junk)-foods he eats   (but diarrhea not a problem, when he \"eats regularly\").   :   Denies dysuria, hematuria, hesitancy, or urgency.  HEME:  Denies easy bruising, bleeding, or problem with clots.   ALLG:  Denies allergies, asthma, or hives.    MSK:  Denies weakness, edema,  +occasional low back stiffness.  +tightness in left ankle.    (notes prior ankle injury when 29.  Declined to have surgery on it,   And could not afford PT, at that time.  Has been tight since then.  More tightness over the past couple of weeks).   SKIN:  Denies rashes, itches, or sores.   NEURO:  Denies numbness or tingling, altered sensation, or focal weakness.    ENDO:  Denies heat or cold intolerance, polyuria, or polydipsia.  PSYCH:  Denies substance abuse,   +Depression, as noted in HPI / PHQ-9.       Past Medical History:   Diagnosis Date   • Kidney stone 2013   • Ankle joint pain 2010    tendon problem with left ankle. (declined surgery).    • Anxiety    • Depression      No past surgical history on file. - denies surgeries.       Family History   Problem Relation Age of Onset   • Depression Mother    • Stroke Maternal Grandmother    • Cancer Maternal Grandfather    • Diabetes Maternal Grandfather    • Heart Disease Paternal Grandfather    • Stroke Paternal Grandfather    • Diabetes Maternal Aunt        Social History     Social History   • Marital status: Single     Spouse name: N/A   • Number of children: N/A   • Years of education: N/A     Occupational History   • [employer]     Social History Main Topics   • Smoking status: Never Smoker   • " "Smokeless tobacco: Never Used   • Alcohol use Yes      Comment: 1-2 per month   • Drug use: No   • Sexual activity: Yes     Partners: Female     Other Topics Concern   • Not on file     Social History Narrative    Daughter - born Dec. 1, 2015         Current Outpatient Prescriptions   Medication Sig Dispense Refill   • fluoxetine (PROZAC) 20 MG Cap Take 1 Cap by mouth every day. 30 Cap 2         Patient Active Problem List    Diagnosis Date Noted   • Obesity (BMI 30-39.9) 08/07/2017   • Depression 08/07/2017         Physical Exam  /79   Pulse 85   Temp 36.6 °C (97.9 °F)   Resp 18   Ht 1.753 m (5' 9\")   Wt 93.4 kg (206 lb)   SpO2 96%   BMI 30.42 kg/m²     General:  Alert and oriented, No apparent distress.  Eyes: Pupils equal and reactive. No scleral icterus.   Eyes with slight irritation / redness, (but he states he was just      rubbing his eyes, from some irritation, on his way to the clinic).   Throat: Clear, no erythema or exudates noted.  Neck: Supple. No lymphadenopathy noted. Thyroid not enlarged.  Cardiovascular: Regular rate and rhythm.  No murmurs, rubs or gallops.  Lungs: Clear to auscultation bilaterally.  No wheezes or rales.  Abdomen:  Soft.  Non-distended.  Non-tender.  No rebound or guarding noted.  Extremities: No clubbing, cyanosis, edema.  +stiffness to left ankle, with decreased dorsi-flexion (with pain).   +Left ankle held slightly more plantar-flexed.  Neurological:  Motor function and sensation grossly intact and symmetrical.   Psychological: Appears to be tired, with slightly decreased facial expressions, today.   Skin: Clear. No rash or suspicious skin lesions noted.      Labs  Recent CBC, CMP, and TSH were unremarkable.         Assessment & Plan      Depression  Patient has had slight setback in treatment of his depression. After having been placed on antibiotics a few weeks ago, he seems to have had a worsening in his depression. However since stopping antibiotics he has " become partially improved again. His PHQ-9 score today was 15 (which was worse then the 8 from last month, but still better than the 20 from when he began treatment).    He appears willing and interested in trying to improve his outcome. He is trying to get back to taking his medications on a regular basis (and is trying to find a better time during the day to take his medications regularly).  He is going to try to walk more, and try to return to a more regular/healthy diet.  He states he is still willing to go to counseling, but his next session is not scheduled until next month (having had the prior appointment canceled due to change in the therapist's schedule).    He is to try to take his fluoxetine on a consistent, regular basis.  He is to try to walk more, regularly.  He is to try to have more healthy diet.      Obesity  Patient recently gained 3 pounds, after having become slightly more depressed, as well as having been on vacation. He states that both of these reasons are the cause for his increase in eating junk food, and gaining weight.  He states that he is interested in getting back on healthy diet, and would like to go back to losing weight.  On his prior visit, he had recently lost 12 pounds, by decreasing his soda and sugar intake.     He is to go back on this diet, and try to avoid unhealthy foods.  He had previously been doing well, with his own version of a diet.      So we will see if he improves his diet (as he takes the fluoxetine more regularly).       If so, he should be able to continue to lose weight on his prior diet.       Ankle Stiffness  (left)  Patient had a prior ankle injury when 29.  He declined to have surgery on it, at that time, and he could not afford PT, at that time.  It has been tight since then.  However, he has noticed more tightness over the past couple of weeks.   He has been given a booklet on ankle care, that includes stretches that he can do on his ankle.          Parts  of this note were created with a computerized dictation system.    Despite review, there may be some spelling or grammatical errors.    Christofer Sykes M.D.  10/18/2017

## 2017-11-06 DIAGNOSIS — F32.A DEPRESSION, UNSPECIFIED DEPRESSION TYPE: ICD-10-CM

## 2017-11-06 RX ORDER — FLUOXETINE HYDROCHLORIDE 20 MG/1
20 CAPSULE ORAL DAILY
Qty: 90 CAP | Refills: 2 | Status: SHIPPED | OUTPATIENT
Start: 2017-11-06 | End: 2017-11-16 | Stop reason: SDUPTHER

## 2017-11-06 NOTE — TELEPHONE ENCOUNTER
Last seen: 10/18/17 by Dr. Sykes  Next appt: 11/16/17 with Dr. Sykes    Was the patient seen in the last year in this department? Yes   Does patient have an active prescription for medications requested? No   Received Request Via: Pharmacy

## 2017-11-16 ENCOUNTER — OFFICE VISIT (OUTPATIENT)
Dept: INTERNAL MEDICINE | Facility: MEDICAL CENTER | Age: 32
End: 2017-11-16
Payer: COMMERCIAL

## 2017-11-16 VITALS
DIASTOLIC BLOOD PRESSURE: 74 MMHG | HEART RATE: 66 BPM | OXYGEN SATURATION: 97 % | BODY MASS INDEX: 29.96 KG/M2 | RESPIRATION RATE: 14 BRPM | HEIGHT: 69 IN | WEIGHT: 202.3 LBS | SYSTOLIC BLOOD PRESSURE: 111 MMHG | TEMPERATURE: 97.1 F

## 2017-11-16 DIAGNOSIS — E66.9 OBESITY (BMI 30-39.9): ICD-10-CM

## 2017-11-16 DIAGNOSIS — F32.A DEPRESSION, UNSPECIFIED DEPRESSION TYPE: ICD-10-CM

## 2017-11-16 PROCEDURE — 99214 OFFICE O/P EST MOD 30 MIN: CPT | Mod: GC | Performed by: INTERNAL MEDICINE

## 2017-11-16 RX ORDER — FLUOXETINE HYDROCHLORIDE 20 MG/1
40 CAPSULE ORAL DAILY
Qty: 90 CAP | Refills: 2 | Status: SHIPPED | OUTPATIENT
Start: 2017-11-16 | End: 2018-06-04 | Stop reason: SDUPTHER

## 2017-11-16 ASSESSMENT — PATIENT HEALTH QUESTIONNAIRE - PHQ9
5. POOR APPETITE OR OVEREATING: 2 - MORE THAN HALF THE DAYS
CLINICAL INTERPRETATION OF PHQ2 SCORE: 4
SUM OF ALL RESPONSES TO PHQ QUESTIONS 1-9: 17

## 2017-11-16 NOTE — PROGRESS NOTES
Established Patient    CC: depression follow-up    HPI:  Jose Luong is a 32 y.o. Male, who established with this office in August. At time, he had been noting the feelings of depression for over 2 years, but wasn't treated during that time. In August, we started him on fluoxetine, and he initially appeared to be responding well to this. His PHQ-9 score initially improved from 20 to 8. However, about 6 weeks ago, he had a cold, and received antibiotics. He states that during this time he decreased his adherence to his medications (missing doses periodically), as well as believing that antibiotics had interfered with her effectiveness. He then went on a vacation and also began eating more junk food, and not following his diet as well. He is then seen (in this clinic) use, approximately one month ago. At that time, we discussed adhering to his medications, as well as trying to increase his exercise and go back to his diet that had previously been working.     However, he states that he has had difficulty being adherent with diet and exercise.  He has recently continued to eat larger portions of junkfood, and has decreased his exercise (due to it being more cold outside). He states he did previously been walking frequently (several times around the outside of the mall). However, since it is become more cold outside, he has not been doing that.  He states that he has attempted to improve his consistency with taking his medication. And, that at least for the past week, he has been taking his medication daily.      Also notes that, though he was referred to psychotherapy when he first established care here, his initial appointment was postponed due to an emergency on part of therapist he is going to see. And, the soonest they could reschedule his initial visit with them, was for the end of this month (in about 2 weeks from now).      He also notes that he has had more difficulty sleeping (awakening during the night).  "Additionally, he has to awaken early (around 4 AM), for his work. And occasionally does not back from work, until 8 PM.  He has also had more difficulty trying to find activities that would interest him. He notes that he used to skateboard a fair amount, but that he has not done much recently, due to decreased flexibility of his left ankle.      He also notes left ankle stiffness, after having broken his left ankle, when he is 29. He did not have money for physical therapy at that time, so he did not get physical therapy. As a result it has been stiff since that time. On his last visit, he is given a booklet about stretching and increasing range of motion for his ankle. He states his been trying to keep it flexible, but that he usually \"pops\" it in the morning, and then is somewhat better throughout the day (but still with a stiffness or decreased flexibility).       Depression Screening  Little interest or pleasure in doing things?  2 - more than half the days  Feeling down, depressed , or hopeless? 2 - more than half the days  Trouble falling or staying asleep, or sleeping too much?  3 - nearly every day  Feeling tired or having little energy?  1 - several days  Poor appetite or overeating?  2 - more than half the days  Feeling bad about yourself - or that you are a failure or have let yourself or your family down? 3 - nearly every day  Trouble concentrating on things, such as reading the newspaper or watching television? 2 - more than half the days  Moving or speaking so slowly that other people could have noticed.  Or the opposite - being so fidgety or restless that you have been moving around a lot more than usual?  1 - several days  Thoughts that you would be better off dead, or of hurting yourself?  1 - several days  Patient Health Questionnaire Score: 17    Interpretation of PHQ-9 Total Score   Score Severity   1-4 No Depression   5-9 Mild Depression   10-14 Moderate Depression   15-19 Moderately Severe " Depression   20-27 Severe Depression        ROS - Review of Symptoms  GEN/CONST:   Denies fever, weakness, or significant weight change.   (although chart notes a 3 lb loss over the past month).  H/E:     Denies blurry vision or eye pain.  ENT:   Denies sinus, throat, or ear pain.  CARDIO:   Denies chest pain, or palpitations,  RESP:   Denies shortness of breath, or dyspnea.  GI:    Denies nausea, vomiting, diarrhea, constipation, or abdominal pain.   :   Denies dysuria, hesitancy, or urgency.  HEME:  Denies easy bruising, bleeding,    ALLG:  Denies allergies, asthma, or hives.    MSK:  Denies weakness, swellings, or muscle aches.   +stiff left ankle, chronic and unchanged.   SKIN:  Denies rashes, itches, or sores.   NEURO:  Denies numbness or tingling, altered sensation, or focal weakness.    ENDO:  Denies heat or cold intolerance, polyuria, or polydipsia.  PSYCH:  Denies substance abuse.   +anxiety, depression, and insomnia (per HPI).       Family History   Problem Relation Age of Onset   • Depression Mother    • Stroke Maternal Grandmother    • Cancer Maternal Grandfather    • Diabetes Maternal Grandfather    • Heart Disease Paternal Grandfather    • Stroke Paternal Grandfather    • Diabetes Maternal Aunt        Social History     Social History   • Marital status: Single     Spouse name: N/A   • Number of children: N/A   • Years of education: N/A     Occupational History   • [employer] manufacture [employer]     Social History Main Topics   • Smoking status: Never Smoker   • Smokeless tobacco: Never Used   • Alcohol use Yes      Comment: 1-2 per month   • Drug use:      Types: Marijuana   • Sexual activity: Yes     Partners: Female     Other Topics Concern   • Not on file     Social History Narrative    Daughter - born Dec. 1, 2015         Current Outpatient Prescriptions   Medication Sig Dispense Refill   • fluoxetine (PROZAC) 20 MG Cap Take 1 Cap by mouth every day. 90 Cap 2   • amoxicillin (AMOXIL) 500 MG Cap  "Take 1 Cap by mouth 2 times a day. 20 Cap 0     No current facility-administered medications for this visit.        Patient Active Problem List    Diagnosis Date Noted   • Obesity (BMI 30-39.9) 08/07/2017   • Depression 08/07/2017           Physical Exam  /74   Pulse 66   Temp 36.2 °C (97.1 °F)   Resp 14   Ht 1.753 m (5' 9\")   Wt 91.8 kg (202 lb 4.8 oz)   SpO2 97%   BMI 29.87 kg/m²   General:  Alert and oriented, No apparent distress.  Looks slightly tired.   Eyes: Pupils equal and reactive. No scleral icterus. EOMI.   Throat: Clear, no erythema or exudates noted.  Neck: Supple. No lymphadenopathy noted. Thyroid not enlarged.  Lungs: Clear to auscultation bilaterally.  No wheezes or rales.  Cardiovascular: Regular rate and rhythm.  No murmurs, rubs or gallops.  Abdomen:  Soft. Overweight.   Non-tender.  No rebound or guarding noted.  Extremities: No leg edema.  Left ankle slightly stiffer on passive-ROM.   Neurological:  Motor function and sensation grossly intact and symmetrical.   Psychological: +Appears tired with limited facial / emotional responses.       Labs:  Last labs were mostly unremarkable.  His last TSH was normal at 1.98.        Assessment & Plan    1. Depression   Patient states he has recently been adherent to his medication (for the past week), but notes that (prior to that week) he has missed some of his doses (periodically). He was advised to place medications near his toothbrush, as that is part of his normal morning routine, and he is more likely to continue taking it that way.  He is encouraged to find activities he can enjoy, and to try and walk more. She is also advised to try to walk on the treadmill at his apartment gym, since it is getting colder outside.  He is scheduled to meet with psychotherapist in approximately 2 weeks.  His fluoxetine was increased from 20 mg, to 40 mg, daily.    2. Obesity (BMI 30-39.9)  As noted above, he is advised to find alternate forms of " exercise, including possible treadmill or use of his apartment gym (given that it is getting colder outside).    He also notes that he is agreeable to trying to improve his diet is again, but has found it difficult in the past. He had been eating more junk food, but is agreeable to trying to cut back and moderate the size of portions, as well as avoiding obviously poor choices in food.  Despite his statements of still not eating well, she has lost 3 pounds since last month (per chart vitals). Hopefully, he will be able to continue with his weight loss.        Parts of this note were created with a computerized dictation system.    Despite review, there may be some spelling or grammatical errors.    Christofer Sykes M.D.  11/16/2017

## 2017-11-16 NOTE — PATIENT INSTRUCTIONS
Please increase your fluoxetine (take two pills, instead of one, until your new prescription).   Please follow-up with your therapy appointment.   Please try to find some time to exercise, and stay active.  Please avoid large amounts of junk-food.  Thank you.

## 2017-11-30 ENCOUNTER — OFFICE VISIT (OUTPATIENT)
Dept: BEHAVIORAL HEALTH | Facility: PHYSICIAN GROUP | Age: 32
End: 2017-11-30
Payer: COMMERCIAL

## 2017-11-30 DIAGNOSIS — F33.0 MILD EPISODE OF RECURRENT MAJOR DEPRESSIVE DISORDER (HCC): ICD-10-CM

## 2017-11-30 PROCEDURE — 90791 PSYCH DIAGNOSTIC EVALUATION: CPT | Performed by: SOCIAL WORKER
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
## 2017-12-03 ENCOUNTER — OFFICE VISIT (OUTPATIENT)
Dept: URGENT CARE | Facility: CLINIC | Age: 32
End: 2017-12-03
Payer: COMMERCIAL

## 2017-12-03 VITALS
WEIGHT: 199 LBS | HEART RATE: 80 BPM | SYSTOLIC BLOOD PRESSURE: 110 MMHG | TEMPERATURE: 98 F | BODY MASS INDEX: 29.47 KG/M2 | OXYGEN SATURATION: 98 % | RESPIRATION RATE: 20 BRPM | DIASTOLIC BLOOD PRESSURE: 70 MMHG | HEIGHT: 69 IN

## 2017-12-03 DIAGNOSIS — K52.9 GASTROENTERITIS: ICD-10-CM

## 2017-12-03 DIAGNOSIS — R53.81 MALAISE: ICD-10-CM

## 2017-12-03 LAB
FLUAV+FLUBV AG SPEC QL IA: NORMAL
INT CON NEG: NEGATIVE
INT CON POS: POSITIVE

## 2017-12-03 PROCEDURE — 87804 INFLUENZA ASSAY W/OPTIC: CPT | Performed by: NURSE PRACTITIONER

## 2017-12-03 PROCEDURE — 99214 OFFICE O/P EST MOD 30 MIN: CPT | Performed by: NURSE PRACTITIONER

## 2017-12-03 ASSESSMENT — ENCOUNTER SYMPTOMS
SORE THROAT: 1
HEADACHES: 1
DIARRHEA: 1
ROS GI COMMENTS: 1
NAUSEA: 1
COUGH: 1
MYALGIAS: 1
VOMITING: 1
CHILLS: 1

## 2017-12-03 NOTE — PROGRESS NOTES
Subjective:      Jose Luong is a 32 y.o. male who presents with GI Problem (4 days ago vomiting and diarrhea a lot, same day chest pain .)    Past Medical History:   Diagnosis Date   • Ankle joint pain 2010    tendon problem with left ankle. (declined surgery).    • Anxiety    • Depression    • Kidney stone 2013     Social History     Social History   • Marital status: Single     Spouse name: N/A   • Number of children: N/A   • Years of education: N/A     Occupational History   • battery manufacture [employer]     Social History Main Topics   • Smoking status: Never Smoker   • Smokeless tobacco: Never Used   • Alcohol use Yes      Comment: 1-2 per month   • Drug use:      Types: Marijuana      Comment: occasional, for sleep   • Sexual activity: Yes     Partners: Female     Other Topics Concern   • Not on file     Social History Narrative    Daughter - born Dec. 1, 2015     Family History   Problem Relation Age of Onset   • Depression Mother    • Stroke Maternal Grandmother    • Alcohol abuse Maternal Grandmother    • Cancer Maternal Grandfather    • Diabetes Maternal Grandfather    • Heart Disease Paternal Grandfather    • Stroke Paternal Grandfather    • Diabetes Maternal Aunt    • Drug abuse Maternal Aunt        Allergies: Patient has no known allergies.    Patient is a 32-year-old male who presents today with complaint of body aches, fatigue, and chills. Patient states symptoms started on the night of 11/30 when he had over 10 episodes of vomiting and diarrhea. This lasted until about 5 AM on December 1 when symptoms resolved. States he has continued to have body aches with fatigue. He has been tolerating clear fluids. Denies knowing a fever. Patient states he has had chest wall pain after vomiting aon 11/30; states pain is reproduced with movement and deep inspiration.         GI Problem   This is a new problem. The current episode started in the past 7 days. The problem occurs constantly. The problem has  "been unchanged. Associated symptoms include chest pain, chills, congestion, coughing, headaches, myalgias, nausea, a sore throat and vomiting. Associated symptoms comments: diarrhea. Nothing aggravates the symptoms. He has tried nothing for the symptoms. The treatment provided no relief.       Review of Systems   Constitutional: Positive for chills and malaise/fatigue.   HENT: Positive for congestion and sore throat.    Respiratory: Positive for cough.    Cardiovascular: Positive for chest pain.   Gastrointestinal: Positive for diarrhea, nausea and vomiting.        1   Musculoskeletal: Positive for myalgias.   Skin: Negative.    Neurological: Positive for headaches.   All other systems reviewed and are negative.         Objective:     /70   Pulse 80   Temp 36.7 °C (98 °F)   Resp 20   Ht 1.753 m (5' 9\")   Wt 90.3 kg (199 lb)   SpO2 98%   BMI 29.39 kg/m²      Physical Exam   Constitutional: He is oriented to person, place, and time. He appears well-developed and well-nourished. No distress.   HENT:   Head: Normocephalic.   Right Ear: External ear normal.   Left Ear: External ear normal.   Mouth/Throat: Oropharynx is clear and moist.   Eyes: Conjunctivae and EOM are normal. Pupils are equal, round, and reactive to light. Right eye exhibits no discharge. Left eye exhibits no discharge.   Neck: Normal range of motion. Neck supple.   Cardiovascular: Normal rate and regular rhythm.    Pulmonary/Chest: Effort normal and breath sounds normal.   Chest wall pain with movement, no subcutaneous emphysema. Sats 98% RA.    Abdominal: Soft. He exhibits no distension. There is no tenderness.   Musculoskeletal: Normal range of motion.   Neurological: He is alert and oriented to person, place, and time.   Skin: Skin is warm and dry. Capillary refill takes less than 2 seconds. He is not diaphoretic.   Psychiatric: He has a normal mood and affect. His behavior is normal. Judgment and thought content normal.   Vitals " reviewed.    poct influenza: negative           Assessment/Plan:   Gastroenteritis  Fatigue   -gatorade, sugar free   -pedialyte   -tylenol PRN   -bland diet   -ER precautions for developing abdominal pain, intractable vomiting or diarrhea   -declined RX for zofran at this time.   There are no diagnoses linked to this encounter.

## 2017-12-03 NOTE — LETTER
[facility]  [address]  [place] [address]     December 3, 2017    Patient: Jose Luong   YOB: 1985   Date of Visit: 12/3/2017       To Whom It May Concern:    Jose Luong was seen and treated in our department on 12/3/2017. If you have any questions or concerns feel free to call [phone].    Sincerely,     Clyde Hussein, Med Ass't

## 2017-12-28 ENCOUNTER — OFFICE VISIT (OUTPATIENT)
Dept: BEHAVIORAL HEALTH | Facility: PHYSICIAN GROUP | Age: 32
End: 2017-12-28
Payer: COMMERCIAL

## 2017-12-28 DIAGNOSIS — F33.0 MILD EPISODE OF RECURRENT MAJOR DEPRESSIVE DISORDER (HCC): ICD-10-CM

## 2017-12-28 PROCEDURE — 90834 PSYTX W PT 45 MINUTES: CPT | Performed by: SOCIAL WORKER

## 2017-12-28 NOTE — BH THERAPY
" [facility]  Therapy Progress Note    Patient Name: Jose Luong  Patient MRN: [id number]  Today's Date: 12/28/2017     Type of session:Individual psychotherapy  Length of session: 45 minutes  Persons in attendance:Patient    Subjective/New Info: First visit since the [facility] 6 weeks ago. Client reported things have been pretty stable since he last came in. He stated he wants help with his feeling of frustration at work, especially with holding on to little things, and feeling like he doesn't get what he deserves. Psycoeducation on thought disorders. Asked client to stat noticing his distorted thinking using the who-what-when-when approach: observing.    Objective/Observations:   Participation: Active verbal participation, Attentive, Engaged and Open to feedback   Grooming: Adequate and Casual   Cognition: Alert and Fully Oriented   Eye contact: Good   Mood: Euthymic   Affect: Flexible and Congruent with content   Thought process: Logical and Goal-directed   Speech: Rate within normal limits and Volume within normal limits   Other:     Diagnoses:   1. Mild episode of recurrent major depressive disorder (CMS-HCC)         Current risk:   SUICIDE: Low   Homicide: Not applicable   Self-harm: Not applicable   Relapse: Not applicable   Other:    Safety Plan reviewed? Not Indicated   If evidence of imminent risk is present, intervention/plan:     Therapeutic Intervention(s): Clarify:  Clarify thoughts, Cognitive modification and Establish rapport    Treatment Goal(s)/Objective(s) addressed: Treatment plan goals addressed today:        - Develop and utilize skills to manage mood and emotional suffering more effectively  - Learn to successfully challenge & change distortions in thinking  - Learn to be more emotionally flexible/resilient in times of stress/challenges          Progress toward Treatment Goals: No change    Plan:  - Continue Individual therapy  - \"Homework\" recommendation: Notice thoughts  - Next " appointment scheduled:  1/11/2018  - Patient is in agreement with the above plan:  YES    Brenda Young  12/28/2017

## 2017-12-29 ENCOUNTER — APPOINTMENT (OUTPATIENT)
Dept: INTERNAL MEDICINE | Facility: MEDICAL CENTER | Age: 32
End: 2017-12-29
Payer: COMMERCIAL

## 2018-01-11 ENCOUNTER — OFFICE VISIT (OUTPATIENT)
Dept: BEHAVIORAL HEALTH | Facility: PHYSICIAN GROUP | Age: 33
End: 2018-01-11
Payer: COMMERCIAL

## 2018-01-11 DIAGNOSIS — F33.0 MILD EPISODE OF RECURRENT MAJOR DEPRESSIVE DISORDER (HCC): ICD-10-CM

## 2018-01-11 PROCEDURE — 90834 PSYTX W PT 45 MINUTES: CPT | Performed by: SOCIAL WORKER

## 2018-01-11 NOTE — BH THERAPY
" [facility]  Therapy Progress Note    Patient Name: Jose Luong  Patient MRN: [id number]  Today's Date: 1/11/2018     Type of session:Individual psychotherapy  Length of session: 45 minutes  Persons in attendance:Patient    Subjective/New Info: Client reported he started noticing his thoughts, and was able to let them go. This helped with a perspective shift at work, and has decreased some of his suffering. \"I know why I am there. I can let things go, and make work doable.\" Discussed his personal goals. Client has a lot of family related challenges he would like to work through.    Objective/Observations:   Participation: Active verbal participation and Engaged   Grooming: Adequate and Casual   Cognition: Alert and Fully Oriented   Eye contact: Good   Mood: Euthymic   Affect: Constricted   Thought process: Logical and Goal-directed   Speech: Rate within normal limits and Volume within normal limits   Other:     Diagnoses:   1. Mild episode of recurrent major depressive disorder (CMS-[facility])         Current risk:   SUICIDE: Low   Homicide: Not applicable   Self-harm: Not applicable   Relapse: Not applicable   Other:    Safety Plan reviewed? Not Indicated   If evidence of imminent risk is present, intervention/plan:     Therapeutic Intervention(s): Supportive psychotherapy    Treatment Goal(s)/Objective(s) addressed:     · Develop and utilize skills to manage mood and emotional suffering more effectively  · Learn to successfully challenge & change distortions in thinking  · Learn to be more emotionally flexible/resilient in times of stress/challenges  · Process past challenges and move toward future goals    Progress toward Treatment Goals: Moderate improvement    Plan:  - Continue Individual therapy  - Next appointment scheduled:  1/25/2018  - Patient is in agreement with the above plan:  YES    Brenda Young  1/11/2018                                 "

## 2018-02-02 ENCOUNTER — OFFICE VISIT (OUTPATIENT)
Dept: URGENT CARE | Facility: CLINIC | Age: 33
End: 2018-02-02
Payer: COMMERCIAL

## 2018-02-02 VITALS
WEIGHT: 201 LBS | HEART RATE: 103 BPM | HEIGHT: 69 IN | RESPIRATION RATE: 12 BRPM | DIASTOLIC BLOOD PRESSURE: 80 MMHG | SYSTOLIC BLOOD PRESSURE: 112 MMHG | BODY MASS INDEX: 29.77 KG/M2 | OXYGEN SATURATION: 96 % | TEMPERATURE: 98.2 F

## 2018-02-02 DIAGNOSIS — R55 VASOVAGAL NEAR SYNCOPE: ICD-10-CM

## 2018-02-02 DIAGNOSIS — E86.0 DEHYDRATION: ICD-10-CM

## 2018-02-02 DIAGNOSIS — Z86.19 HISTORY OF VIRAL GASTROENTERITIS: ICD-10-CM

## 2018-02-02 DIAGNOSIS — R11.2 NAUSEA AND VOMITING IN ADULT PATIENT: ICD-10-CM

## 2018-02-02 PROCEDURE — 99214 OFFICE O/P EST MOD 30 MIN: CPT | Performed by: FAMILY MEDICINE

## 2018-02-02 RX ORDER — ONDANSETRON 2 MG/ML
4 INJECTION INTRAMUSCULAR; INTRAVENOUS ONCE
Status: COMPLETED | OUTPATIENT
Start: 2018-02-02 | End: 2018-02-02

## 2018-02-02 RX ORDER — ONDANSETRON HYDROCHLORIDE 8 MG/1
8 TABLET, FILM COATED ORAL EVERY 8 HOURS PRN
Qty: 15 TAB | Refills: 0 | Status: SHIPPED | OUTPATIENT
Start: 2018-02-02 | End: 2018-08-28

## 2018-02-02 RX ADMIN — ONDANSETRON 4 MG: 2 INJECTION INTRAMUSCULAR; INTRAVENOUS at 11:24

## 2018-02-02 ASSESSMENT — PATIENT HEALTH QUESTIONNAIRE - PHQ9: CLINICAL INTERPRETATION OF PHQ2 SCORE: 0

## 2018-02-02 ASSESSMENT — PAIN SCALES - GENERAL: PAINLEVEL: NO PAIN

## 2018-02-02 NOTE — PATIENT INSTRUCTIONS
"Gastroenteritis   Gastroenteritis is an illness of the intestines. It is sometimes called \"stomach flu\". It causes nausea, vomiting, stomach cramps, watery poop (diarrhea) and a slight fever. It is usually caused by a virus. This illness often clears up in 4-5 days. However, it can be serious when people who lose too much fluid from throwing up (vomiting) and/or diarrhea. When too much fluid is lost and has not been replaced, it is called dehydration.   HOME CARE   Wash your hands a lot.   Rest.   Drink fluids slowly. Drinking too much or too fast can cause you to throw up.   Drink \"oral rehydration fluids\" (ORS) as directed. They can be bought in a grocery store or pharmacy. Ask your doctor or pharmacist how to take the ORS if you do not understand.   Do not eat too much at once.   Avoid tobacco, alcohol and drugs that upset your stomach.   BRAT diet start eating bananas, rice, apples and dry toast   GET HELP RIGHT AWAY IF:   You become weak, dizzy, or faint.   You cannot keep fluids down.   You have a dry mouth, no tears and pee less. These are signs of dehydration.   Belly (abdominal) pain starts, feels worse, or stays in one place.   You or your child has a fever above 102º F (38.9º C) for more than 1 day.   Diarrhea has blood or mucous in it.   You become confused.   After 5-7 days, you are still throwing up and/or having diarrhea.   MAKE SURE YOU:   Understand these instructions.   Will watch your condition.   Will get help right away if you are not doing well or get worse.   Document Released: 06/05/2009 Document Re-Released: 11/30/2009   [employer]® Patient Information ©2011 [employer].  "

## 2018-02-02 NOTE — LETTER
February 2, 2018         Patient: Jose Luong   YOB: 1985   Date of Visit: 2/2/2018           To Whom it May Concern:    Jose Luong was seen in my clinic on 2/2/2018. Please excuse patient from work due to illness on 2/1-2/2/18.    If you have any questions or concerns, please don't hesitate to call.        Sincerely,           Comfort Mathias D.O.  Electronically Signed

## 2018-02-02 NOTE — PROGRESS NOTES
"Subjective:      Jose Luong is a 32 y.o. male who presents with Emesis (started this morning,) and Diarrhea  Patient presents urgent care with acute onset of nausea vomiting diarrhea awoke this morning. His daughter has been ill with the same symptoms earlier this week he has been caring for her. Her symptoms have now resolved. He states that he did have something similar to this in December which lasted 24 hours. He is feeling slightly lightheaded and faint has not been able to keep much in.    HPI  ROS  PMH:  has a past medical history of Ankle joint pain (2010); Anxiety; Depression; and Kidney stone (2013).  MEDS:   Current Outpatient Prescriptions:   •  fluoxetine (PROZAC) 20 MG Cap, Take 2 Caps by mouth every day., Disp: 90 Cap, Rfl: 2    Current Facility-Administered Medications:   •  ondansetron (ZOFRAN) syringe/vial injection 4 mg, 4 mg, Intramuscular, Once, Comfort Mathias D.O.  ALLERGIES: No Known Allergies  SURGHX: History reviewed. No pertinent surgical history.  SOCHX:  reports that he has never smoked. He has never used smokeless tobacco. He reports that he drinks alcohol. He reports that he uses drugs, including Marijuana.  FH: Family history was reviewed, no pertinent findings to report     Objective:     /80   Pulse (!) 103   Temp 36.8 °C (98.2 °F)   Resp 12   Ht 1.753 m (5' 9\")   Wt 91.2 kg (201 lb)   SpO2 96%   BMI 29.68 kg/m²      Physical Exam   Constitutional: He is oriented to person, place, and time.   Appears ill   HENT:   Slightly dry mucus membranes     Eyes: Conjunctivae are normal.   Cardiovascular:   Mildly tachycardic   Pulmonary/Chest: Effort normal and breath sounds normal. No respiratory distress. He has no wheezes. He has no rales. He exhibits no tenderness.   Abdominal: Soft. He exhibits no distension and no mass. Bowel sounds are increased. There is tenderness. There is no rebound and no guarding. No hernia.   Diffusely tender   Musculoskeletal: Normal range " of motion. He exhibits no edema.   Neurological: He is alert and oriented to person, place, and time.   Skin: Skin is warm and dry.   Psychiatric: He has a normal mood and affect. His behavior is normal.       Therapeutics: zofran 4mg IM                         Given 8 Oz of water and two small apple juices       Assessment/Plan:     1. History of viral gastroenteritis  ondansetron (ZOFRAN) syringe/vial injection 4 mg    ondansetron (ZOFRAN) 8 MG Tab   2. Nausea and vomiting in adult patient  ondansetron (ZOFRAN) 8 MG Tab   3. Dehydration     4. Vasovagal near syncope       Wife picking up pt  ER prec given regarding IV fluids

## 2018-02-23 ENCOUNTER — APPOINTMENT (OUTPATIENT)
Dept: BEHAVIORAL HEALTH | Facility: PHYSICIAN GROUP | Age: 33
End: 2018-02-23
Payer: COMMERCIAL

## 2018-06-04 DIAGNOSIS — F32.A DEPRESSION, UNSPECIFIED DEPRESSION TYPE: ICD-10-CM

## 2018-06-04 RX ORDER — FLUOXETINE HYDROCHLORIDE 20 MG/1
40 CAPSULE ORAL DAILY
Qty: 90 CAP | Refills: 2 | Status: SHIPPED | OUTPATIENT
Start: 2018-06-04 | End: 2018-08-28

## 2018-06-04 NOTE — TELEPHONE ENCOUNTER
Was the patient seen in the last year in this department? Yes  Pt last seen on: 11/16/2017 by Dr. Sykes Next appt on: none      Does patient have an active prescription for medications requested? No     Received Request Via: Pharmacy

## 2018-08-28 ENCOUNTER — OFFICE VISIT (OUTPATIENT)
Dept: INTERNAL MEDICINE | Facility: MEDICAL CENTER | Age: 33
End: 2018-08-28
Payer: COMMERCIAL

## 2018-08-28 VITALS
DIASTOLIC BLOOD PRESSURE: 80 MMHG | TEMPERATURE: 97.6 F | OXYGEN SATURATION: 93 % | SYSTOLIC BLOOD PRESSURE: 116 MMHG | HEART RATE: 76 BPM | WEIGHT: 208.6 LBS | HEIGHT: 69 IN | BODY MASS INDEX: 30.9 KG/M2 | RESPIRATION RATE: 18 BRPM

## 2018-08-28 DIAGNOSIS — E66.9 OBESITY (BMI 30-39.9): ICD-10-CM

## 2018-08-28 DIAGNOSIS — F33.2 SEVERE EPISODE OF RECURRENT MAJOR DEPRESSIVE DISORDER, WITHOUT PSYCHOTIC FEATURES (HCC): ICD-10-CM

## 2018-08-28 PROBLEM — F32.A DEPRESSION: Status: RESOLVED | Noted: 2017-08-07 | Resolved: 2018-08-28

## 2018-08-28 PROCEDURE — 99213 OFFICE O/P EST LOW 20 MIN: CPT | Mod: GE | Performed by: INTERNAL MEDICINE

## 2018-08-28 RX ORDER — FLUOXETINE HYDROCHLORIDE 40 MG/1
40 CAPSULE ORAL DAILY
Qty: 90 CAP | Refills: 1 | Status: SHIPPED | OUTPATIENT
Start: 2018-08-28 | End: 2019-11-17

## 2018-08-28 ASSESSMENT — PATIENT HEALTH QUESTIONNAIRE - PHQ9
5. POOR APPETITE OR OVEREATING: 1 - SEVERAL DAYS
CLINICAL INTERPRETATION OF PHQ2 SCORE: 6
SUM OF ALL RESPONSES TO PHQ QUESTIONS 1-9: 20

## 2018-08-28 ASSESSMENT — PAIN SCALES - GENERAL: PAINLEVEL: NO PAIN

## 2018-08-28 NOTE — PROGRESS NOTES
Established Patient     Chief Complaint   Patient presents with   • Follow-Up     depression        HPI:  Jose Luong is a 33 y.o. male here for depression.     Depression   Has been off of medications for about 1 month (and just restarted 3 days ago).  Additionally, prior to stopping the meds, he had been taking them only intermittently, and taking only 1 pill, rather than 2. Since stopping the medication, he has noted he gained weight, and felt run-down, and tired.  He feels more depressed than before.   Notes more difficulty getting out of bed.     Feels stressed at work, and dislikes job (not rewarding).  Would like to get other job, but limited education, and difficulty getting application in before jobs are filled.   He just restarted (3 days ago) taking Prozac 40.   PHQ-9 screening:  Little interest or pleasure in doing things?  3 - nearly every day  Feeling down, depressed , or hopeless? 3 - nearly every day  Trouble falling or staying asleep, or sleeping too much?  3 - nearly every day  Feeling tired or having little energy?  3 - nearly every day  Poor appetite or overeating?  1 - several days  Feeling bad about yourself - or that you are a failure or have let yourself or your family down? 2 - more than half the days  Trouble concentrating on things, such as reading the newspaper or watching television? 2 - more than half the days  Moving or speaking so slowly that other people could have noticed.  Or the opposite - being so fidgety or restless that you have been moving around a lot more than usual?  3 - nearly every day  Thoughts that you would be better off dead, or of hurting yourself?  0 - not at all  Patient Health Questionnaire Score: 20  Interpretation of PHQ-9 Total Score   Score Severity   1-4 No Depression   5-9 Mild Depression   10-14 Moderate Depression   15-19 Moderately Severe Depression   20-27 Severe Depression    His initial / first PHQ-9 score was 20, prior to starting medication.  It  "then improved to 8, when he was exercising and on medication before.   However, since being off the medication and exercising less, his score has returned to 20.    Has tried counselling, at behavioural health, but the therapist just stared at him, and did not really talk or suggest much.   Would be open to trying other type/location of counseling / therapy.       Review of Symptoms  GEN/CONST:   Denies fever, fatigue, weakness, or significant weight change.   H/E:     Denies blurry vision or eye pain.  ENT:   Denies sinus pain, sore throat, ear pain, difficulty hearing, or tinnitus.  CARDIO:   Denies chest pain, palpitations, orthopnea, or edema.  RESP:   Denies shortness of breath, wheezing, or coughing.  GI:    Denies nausea, vomiting, diarrhea, constipation, or abdominal pain.   :   Denies dysuria, hematuria, hesitancy, or urgency.  HEME:  Denies easy bruising, bleeding, or problem with clots.   ALLG:  Denies allergies, asthma, or hives.    MSK:  Denies weakness, edema, joint pains or swellings, or muscle aches.   SKIN:  Denies rashes, itches, or sores.   NEURO:  Denies numbness or tingling, altered sensation, or focal weakness.    ENDO:  Denies cold intolerance, polyuria, or polydipsia.  + occasional \"overheating\"  PSYCH:  Denies substance abuse, or insomnia.   + depression (see HPI).       Family History   Problem Relation Age of Onset   • Depression Mother    • Stroke Maternal Grandmother    • Alcohol abuse Maternal Grandmother    • Cancer Maternal Grandfather    • Diabetes Maternal Grandfather    • Heart Disease Paternal Grandfather    • Stroke Paternal Grandfather    • Diabetes Maternal Aunt    • Drug abuse Maternal Aunt        Social History     Social History   • Marital status: Single     Spouse name: N/A   • Number of children: N/A   • Years of education: N/A     Occupational History   • [employer]     Social History Main Topics   • Smoking status: Never Smoker   • Smokeless " "tobacco: Never Used   • Alcohol use Yes      Comment: 1-2 per month   • Drug use: Yes     Types: Marijuana      Comment: occasional, for sleep   • Sexual activity: Yes     Partners: Female     Other Topics Concern   • Not on file     Social History Narrative    Daughter - born Dec. 1, 2015       Current Outpatient Prescriptions   Medication Sig Dispense Refill   • FLUoxetine (PROZAC) 20 MG Cap TAKE 2 CAPS BY MOUTH EVERY DAY. 90 Cap 2     No current facility-administered medications for this visit.        No Known Allergies   Denies allergies.     Patient Active Problem List    Diagnosis Date Noted   • Obesity (BMI 30-39.9) 08/07/2017   • Depression 08/07/2017       Physical Exam  /80   Pulse 76   Temp 36.4 °C (97.6 °F)   Resp 18   Ht 1.753 m (5' 9\")   Wt 94.6 kg (208 lb 9.6 oz)   SpO2 93%   BMI 30.80 kg/m²   General:  Alert and oriented, No apparent distress.  HEENT:   No icterus, No erythema or exudates.    Lungs: Clear to auscultation bilaterally.  No wheezes or rales.  Cardiovascular: Regular rate and rhythm.  No murmurs, rubs or gallops.  Abdomen:  Soft.  Non-distended.  Non-tender.  +BS  Extremities: No pedal edema.  Good general motion of all 4 extremities.   Neurological:  Motor function and sensation grossly intact and symmetrical.   Poor reflexes, but symmetrical, may be difficult to relax.   Psychological: Appears to have normal mood and mostly normal affect; but appears with slight flat affect, especially when discussing work.      Labs:  Old labs with normal TSH.   Last CBC and CMP reviewed.       Assessment & Plan    1. Depression - moderately severe.  Patient has had worsening of his depression since being off of his meds.  Also, he is not getting counseling at that time, since she had stopped, due to feeling that the therapist's style was not productive.  He remains open to therapy, but not in that specific style/person.  He has recently restarted Prozac.   Will refill Prozac, with 40 mg " tab, PO, daily.   Will refer to new psychology referral, in hopes that they use a different style.    Also get basic CBC and TSH, for excluding medical issues.  - fluoxetine (PROZAC) 40 MG capsule; Take 1 Cap by mouth every day.  Dispense: 90 Cap; Refill: 1  - CBC WITH DIFFERENTIAL; Future  - TSH WITH REFLEX TO FT4; Future  - REFERRAL TO PSYCHOLOGY    2. Obesity (BMI 30-39.9)  Patient encouraged to watch / managed diet.  Is also been encouraged to exercise more often, even if just getting out for a walk or taking his child for a walk.  This should also help with his depression, in addition to weight.  Also get new CMP, given his increase in weight.  - COMP METABOLIC PANEL; Future      Patient to return in 6 weeks, for follow-up of depression.      A computerized dictation system may have been used for this note.    Despite review, there may be some spelling or grammatical errors.    Christofer Sykes M.D.  8/28/2018   Attending:  Bulmaro Berger M.D.

## 2018-08-28 NOTE — LETTER
August 28, 2018    To Whom It May Concern:    This is confirmation that Jose Luong attended his scheduled appointment with Christofer Sykes M.D. on 8/28/18.    He also had related symptoms/problems when he took leave (on 7/25), and may benefit from a couple more days off to improve, prior to returning to work.     If possible, please excuse him from 8/25-8/31/18 (as one related problem).      Sincerely,          Christofer Sykes M.D.  [phone]  8/28/2018

## 2018-08-28 NOTE — PATIENT INSTRUCTIONS
Please pick-up the new prescription for Prozac.   Please follow-up on the referral for Psychology.   Please get the new labs done, in the next week or so (while fasting, in the morning).   Please return in about 6 weeks.   Thank you.

## 2018-10-10 ENCOUNTER — OFFICE VISIT (OUTPATIENT)
Dept: INTERNAL MEDICINE | Facility: MEDICAL CENTER | Age: 33
End: 2018-10-10
Payer: COMMERCIAL

## 2018-10-10 VITALS
DIASTOLIC BLOOD PRESSURE: 67 MMHG | SYSTOLIC BLOOD PRESSURE: 108 MMHG | WEIGHT: 213.6 LBS | TEMPERATURE: 97.8 F | RESPIRATION RATE: 18 BRPM | BODY MASS INDEX: 33.53 KG/M2 | HEART RATE: 60 BPM | HEIGHT: 67 IN | OXYGEN SATURATION: 95 %

## 2018-10-10 DIAGNOSIS — F33.2 SEVERE EPISODE OF RECURRENT MAJOR DEPRESSIVE DISORDER, WITHOUT PSYCHOTIC FEATURES (HCC): ICD-10-CM

## 2018-10-10 DIAGNOSIS — E66.9 OBESITY (BMI 30-39.9): ICD-10-CM

## 2018-10-10 PROCEDURE — 99213 OFFICE O/P EST LOW 20 MIN: CPT | Mod: GE | Performed by: INTERNAL MEDICINE

## 2018-10-10 ASSESSMENT — PATIENT HEALTH QUESTIONNAIRE - PHQ9
5. POOR APPETITE OR OVEREATING: 3 - NEARLY EVERY DAY
CLINICAL INTERPRETATION OF PHQ2 SCORE: 4
SUM OF ALL RESPONSES TO PHQ QUESTIONS 1-9: 21

## 2018-10-10 ASSESSMENT — PAIN SCALES - GENERAL: PAINLEVEL: NO PAIN

## 2018-10-10 NOTE — LETTER
October 10, 2018        Jose Luong  [address] [place] [address]        Dear Jose:      I didn't get to give you another copy of the lab forms, that were requested on your prior visit.  They are enclosed with this letter.  Please try to have them done in the next week or so.    Thank you.         Christofer Sykes M.D.  Electronically Signed  10/10/2018
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
## 2018-10-10 NOTE — PATIENT INSTRUCTIONS
Please call [facility] ([phone]), to see about scheduling an appointment for therapy (CBT/counselling - for depression).  They may need to check with your insurance (pre-authorization), before knowing if they can schedule you.

## 2018-10-24 ENCOUNTER — SUPERVISING PHYSICIAN REVIEW (OUTPATIENT)
Dept: URGENT CARE | Facility: CLINIC | Age: 33
End: 2018-10-24

## 2018-10-24 ENCOUNTER — OFFICE VISIT (OUTPATIENT)
Dept: URGENT CARE | Facility: CLINIC | Age: 33
End: 2018-10-24
Payer: COMMERCIAL

## 2018-10-24 VITALS
BODY MASS INDEX: 33.59 KG/M2 | HEART RATE: 78 BPM | RESPIRATION RATE: 20 BRPM | DIASTOLIC BLOOD PRESSURE: 80 MMHG | HEIGHT: 67 IN | OXYGEN SATURATION: 96 % | TEMPERATURE: 98.6 F | SYSTOLIC BLOOD PRESSURE: 122 MMHG | WEIGHT: 214 LBS

## 2018-10-24 DIAGNOSIS — G47.00 INSOMNIA, UNSPECIFIED TYPE: ICD-10-CM

## 2018-10-24 DIAGNOSIS — K52.9 AGE (ACUTE GASTROENTERITIS): ICD-10-CM

## 2018-10-24 DIAGNOSIS — R11.0 NAUSEA: ICD-10-CM

## 2018-10-24 PROCEDURE — 99214 OFFICE O/P EST MOD 30 MIN: CPT | Performed by: NURSE PRACTITIONER

## 2018-10-24 RX ORDER — HYDROXYZINE PAMOATE 25 MG/1
25 CAPSULE ORAL 3 TIMES DAILY PRN
Qty: 60 CAP | Refills: 0 | Status: SHIPPED | OUTPATIENT
Start: 2018-10-24 | End: 2019-11-17

## 2018-10-24 ASSESSMENT — ENCOUNTER SYMPTOMS
CONSTIPATION: 0
DIZZINESS: 0
CHILLS: 0
COUGH: 0
NAUSEA: 1
MYALGIAS: 0
SORE THROAT: 0
FEVER: 1
EYE PAIN: 0
DIARRHEA: 1
HEARTBURN: 0
VOMITING: 1
SHORTNESS OF BREATH: 0
ROS GI COMMENTS: 1
BLOOD IN STOOL: 0
ABDOMINAL PAIN: 1
DIAPHORESIS: 0
FATIGUE: 1

## 2018-10-24 NOTE — PROGRESS NOTES
"  Subjective:     Jose Luong is a 33 y.o. male who presents for GI Problem (Couple days stomachache and diarrhwea, fever comes and goes)       GI Problem   This is a new problem. Episode onset: 2 days. The problem occurs constantly. The problem has been waxing and waning. Associated symptoms include abdominal pain, fatigue, a fever, nausea and vomiting. Pertinent negatives include no chest pain, chills, coughing, diaphoresis, myalgias, rash or sore throat. Nothing aggravates the symptoms. He has tried drinking for the symptoms. The treatment provided no relief.   Patient presents to clinic today for evaluation of diarrhea, nausea, vomiting, body aches that have been waxing waning for the last couple days.  Diarrhea has persisted however he is no longer nauseous and vomiting.  He suspects it could have been something he ate at work.  Patient also complaining of insomnia and which when he lies down he feels\"motion sick\".  He is taken nothing for the symptoms.  He has been drinking adequate amounts of fluids.  Past Medical History:   Diagnosis Date   • Ankle joint pain 2010    tendon problem with left ankle. (declined surgery).    • Anxiety    • Depression    • Kidney stone 2013   History reviewed. No pertinent surgical history.  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 1, 2015      Family History   Problem Relation Age of Onset   • Depression Mother    • Stroke Maternal Grandmother    • Alcohol abuse " "Maternal Grandmother    • Cancer Maternal Grandfather    • Diabetes Maternal Grandfather    • Heart Disease Paternal Grandfather    • Stroke Paternal Grandfather    • Diabetes Maternal Aunt    • Drug abuse Maternal Aunt     Review of Systems   Constitutional: Positive for fatigue and fever. Negative for chills and diaphoresis.   HENT: Negative for sore throat.    Eyes: Negative for pain.   Respiratory: Negative for cough and shortness of breath.    Cardiovascular: Negative for chest pain.   Gastrointestinal: Positive for abdominal pain, diarrhea, nausea and vomiting. Negative for blood in stool, constipation, heartburn and melena.        1   Genitourinary: Negative for hematuria.   Musculoskeletal: Negative for myalgias.   Skin: Negative for rash.   Neurological: Negative for dizziness.   No Known Allergies   Objective:   /80 (BP Location: Right arm, Patient Position: Sitting, BP Cuff Size: Large adult)   Pulse 78   Temp 37 °C (98.6 °F) (Temporal)   Resp 20   Ht 1.702 m (5' 7\")   Wt 97.1 kg (214 lb)   SpO2 96%   BMI 33.52 kg/m²   Physical Exam   Constitutional: He is oriented to person, place, and time. He appears well-developed and well-nourished. No distress.   HENT:   Head: Normocephalic and atraumatic.   Right Ear: Tympanic membrane normal.   Left Ear: Tympanic membrane normal.   Nose: Nose normal. Right sinus exhibits no maxillary sinus tenderness and no frontal sinus tenderness. Left sinus exhibits no maxillary sinus tenderness and no frontal sinus tenderness.   Mouth/Throat: Uvula is midline, oropharynx is clear and moist and mucous membranes are normal. No posterior oropharyngeal edema, posterior oropharyngeal erythema or tonsillar abscesses. No tonsillar exudate.   Eyes: Pupils are equal, round, and reactive to light. Conjunctivae and EOM are normal. Right eye exhibits no discharge. Left eye exhibits no discharge.   Cardiovascular: Normal rate and regular rhythm.    No murmur " heard.  Pulmonary/Chest: Effort normal and breath sounds normal. No respiratory distress.   Abdominal: Soft. Bowel sounds are normal. He exhibits no distension. There is no hepatosplenomegaly. There is generalized tenderness. No hernia. Hernia confirmed negative in the ventral area.   Neurological: He is alert and oriented to person, place, and time. He has normal reflexes. No sensory deficit.   Skin: Skin is warm, dry and intact.   Psychiatric: He has a normal mood and affect.         Assessment/Plan:   Assessment    1. AGE (acute gastroenteritis)     2. Nausea  hydrOXYzine pamoate (VISTARIL) 25 MG Cap   3. Insomnia, unspecified type  hydrOXYzine pamoate (VISTARIL) 25 MG Cap     It was explained to the pt. Today that due to the viral nature of the pt's illness, we will treat symptomatically today. Encouraged OTC supportive meds PRN. Humidification, increase fluids, aavoid night time dairy.  Patient given precautionary s/s  x that mandate immediate follow up and evaluation in the ED. Advised of risks of not doing so.    DDX, Supportive care, and indications for immediate follow-up discussed with patient.    Instructed to return to clinic or nearest emergency department if we are not available for any change in condition, further concerns, or worsening of symptoms.    The patient demonstrated a good understanding and agreed with the treatment plan.

## 2019-04-22 ENCOUNTER — OFFICE VISIT (OUTPATIENT)
Dept: URGENT CARE | Facility: MEDICAL CENTER | Age: 34
End: 2019-04-22

## 2019-04-22 VITALS
HEART RATE: 85 BPM | TEMPERATURE: 98.2 F | SYSTOLIC BLOOD PRESSURE: 120 MMHG | WEIGHT: 214 LBS | OXYGEN SATURATION: 93 % | HEIGHT: 67 IN | RESPIRATION RATE: 16 BRPM | BODY MASS INDEX: 33.59 KG/M2 | DIASTOLIC BLOOD PRESSURE: 80 MMHG

## 2019-04-22 DIAGNOSIS — H10.33 ACUTE BACTERIAL CONJUNCTIVITIS OF BOTH EYES: ICD-10-CM

## 2019-04-22 DIAGNOSIS — J02.9 PHARYNGITIS, UNSPECIFIED ETIOLOGY: ICD-10-CM

## 2019-04-22 DIAGNOSIS — B34.9 ACUTE VIRAL SYNDROME: ICD-10-CM

## 2019-04-22 LAB
INT CON NEG: NEGATIVE
INT CON POS: POSITIVE
S PYO AG THROAT QL: NEGATIVE

## 2019-04-22 PROCEDURE — 99214 OFFICE O/P EST MOD 30 MIN: CPT | Performed by: PHYSICIAN ASSISTANT

## 2019-04-22 PROCEDURE — 87880 STREP A ASSAY W/OPTIC: CPT | Performed by: PHYSICIAN ASSISTANT

## 2019-04-22 RX ORDER — POLYMYXIN B SULFATE AND TRIMETHOPRIM 1; 10000 MG/ML; [USP'U]/ML
1 SOLUTION OPHTHALMIC EVERY 4 HOURS
Qty: 10 ML | Refills: 0 | Status: SHIPPED | OUTPATIENT
Start: 2019-04-22 | End: 2019-11-17

## 2019-04-22 RX ORDER — BENZONATATE 100 MG/1
200 CAPSULE ORAL 3 TIMES DAILY PRN
Qty: 60 CAP | Refills: 0 | Status: SHIPPED | OUTPATIENT
Start: 2019-04-22 | End: 2019-11-17

## 2019-04-22 ASSESSMENT — ENCOUNTER SYMPTOMS
SPUTUM PRODUCTION: 1
WHEEZING: 0
CHILLS: 1
EYE PAIN: 0
EYE REDNESS: 1
EYE DISCHARGE: 1
BLURRED VISION: 0
COUGH: 1
SORE THROAT: 1
FEVER: 1
HEMOPTYSIS: 0
HEADACHES: 1
SHORTNESS OF BREATH: 0
DOUBLE VISION: 0
PHOTOPHOBIA: 0
MYALGIAS: 1

## 2019-04-22 NOTE — PATIENT INSTRUCTIONS
"Conjunctivitis  Conjunctivitis is commonly called \"pink eye.\" Conjunctivitis can be caused by bacterial or viral infection, allergies, or injuries. There is usually redness of the lining of the eye, itching, discomfort, and sometimes discharge. There may be deposits of matter along the eyelids. A viral infection usually causes a watery discharge, while a bacterial infection causes a yellowish, thick discharge. Pink eye is very contagious and spreads by direct contact.  You may be given antibiotic eyedrops as part of your treatment. Before using your eye medicine, remove all drainage from the eye by washing gently with warm water and cotton balls. Continue to use the medication until you have awakened 2 mornings in a row without discharge from the eye. Do not rub your eye. This increases the irritation and helps spread infection. Use separate towels from other household members. Wash your hands with soap and water before and after touching your eyes. Use cold compresses to reduce pain and sunglasses to relieve irritation from light. Do not wear contact lenses or wear eye makeup until the infection is gone.  SEEK MEDICAL CARE IF:   · Your symptoms are not better after 3 days of treatment.  · You have increased pain or trouble seeing.  · The outer eyelids become very red or swollen.  Document Released: 01/25/2006 Document Revised: 03/11/2013 Document Reviewed: 12/18/2006  [employer]® Patient Information ©2014 [employer].  Viral Syndrome  You or your child has Viral Syndrome. It is the most common infection causing \"colds\" and infections in the nose, throat, sinuses, and breathing tubes. Sometimes the infection causes nausea, vomiting, or diarrhea. The germ that causes the infection is a virus. No antibiotic or other medicine will kill it. There are medicines that you can take to make you or your child more comfortable.   HOME CARE INSTRUCTIONS   · Rest in bed until you start to feel better.   · If you have diarrhea or " vomiting, eat small amounts of crackers and toast. Soup is helpful.   · Do not give aspirin or medicine that contains aspirin to children.   · Only take over-the-counter or prescription medicines for pain, discomfort, or fever as directed by your caregiver.   SEEK IMMEDIATE MEDICAL CARE IF:   · You or your child has not improved within one week.   · You or your child has pain that is not at least partially relieved by over-the-counter medicine.   · Thick, colored mucus or blood is coughed up.   · Discharge from the nose becomes thick yellow or green.   · Diarrhea or vomiting gets worse.   · There is any major change in your or your child's condition.   · You or your child develops a skin rash, stiff neck, severe headache, or are unable to hold down food or fluid.   · You or your child has an oral temperature above 102° F (38.9° C), not controlled by medicine.   · Your baby is older than 3 months with a rectal temperature of 102° F (38.9° C) or higher.   · Your baby is 3 months old or younger with a rectal temperature of 100.4° F (38° C) or higher.   Document Released: 12/03/2007 Document Revised: 03/11/2013 Document Reviewed: 12/03/2008  [employer]® Patient Information ©2013 [employer].

## 2019-04-22 NOTE — LETTER
April 22, 2019         Patient: Jose Luong   YOB: 1985   Date of Visit: 4/22/2019           To Whom it May Concern:    Jose Luong was seen in my clinic on 4/22/2019. He may return to work on 4/26/19..    If you have any questions or concerns, please don't hesitate to call.        Sincerely,           Angela Shirley P.A.-C.  Electronically Signed

## 2019-04-22 NOTE — PROGRESS NOTES
"Subjective:   Jose Luong is a 33 y.o. male who presents for Pink Eye ((L) eye, hurts to swallow, chills, or sweats, body aches, x 4 days )    This is a new problem.  Patient presents to urgent care with 6-day history of chills, body aches, fever, nasal congestion, cough and sore throat.  Sore throat is rated at severe.  This morning, upon rising, the patient had redness of both eyes and eyes were matted shut.  The eyes have a continue to drain this morning with the left greater than the right.  Patient has had no known exposure to strep or flu.        HPI  Review of Systems   Constitutional: Positive for chills, fever and malaise/fatigue.   HENT: Positive for congestion and sore throat. Negative for ear pain.    Eyes: Positive for discharge and redness. Negative for blurred vision, double vision, photophobia and pain.   Respiratory: Positive for cough and sputum production. Negative for hemoptysis, shortness of breath and wheezing.    Cardiovascular: Negative for chest pain.   Musculoskeletal: Positive for myalgias.   Skin: Negative for rash.   Neurological: Positive for headaches.   All other systems reviewed and are negative.    No Known Allergies     Objective:   /80   Pulse 85   Temp 36.8 °C (98.2 °F)   Resp 16   Ht 1.702 m (5' 7\")   Wt 97.1 kg (214 lb)   SpO2 93%   BMI 33.52 kg/m²   Physical Exam   Constitutional: He is oriented to person, place, and time. He appears well-developed and well-nourished. He does not appear ill. No distress.   HENT:   Head: Normocephalic and atraumatic.   Right Ear: Tympanic membrane, external ear and ear canal normal.   Left Ear: Tympanic membrane, external ear and ear canal normal.   Nose: Mucosal edema present. No rhinorrhea. Right sinus exhibits no maxillary sinus tenderness and no frontal sinus tenderness. Left sinus exhibits no maxillary sinus tenderness and no frontal sinus tenderness.   Mouth/Throat: Uvula is midline and mucous membranes are normal. " Posterior oropharyngeal erythema present. No oropharyngeal exudate. Tonsils are 2+ on the right. Tonsils are 2+ on the left. No tonsillar exudate.   Eyes: Pupils are equal, round, and reactive to light. EOM are normal. Right eye exhibits discharge. Left eye exhibits discharge. Right conjunctiva is injected. Left conjunctiva is injected.   Neck: Normal range of motion. Neck supple.   Cardiovascular: Normal rate, regular rhythm and normal heart sounds.  Exam reveals no friction rub.    No murmur heard.  Pulmonary/Chest: Effort normal and breath sounds normal. No respiratory distress.   Abdominal: Soft. Bowel sounds are normal. There is no hepatosplenomegaly. There is no tenderness.   Musculoskeletal: Normal range of motion.   Lymphadenopathy:        Head (right side): No submental, no submandibular, no tonsillar, no preauricular and no posterior auricular adenopathy present.        Head (left side): No submental, no submandibular, no tonsillar, no preauricular and no posterior auricular adenopathy present.     He has no cervical adenopathy.        Right: No supraclavicular adenopathy present.        Left: No supraclavicular adenopathy present.   Neurological: He is alert and oriented to person, place, and time. He has normal strength. No cranial nerve deficit or sensory deficit. Coordination normal.   Skin: Skin is warm and dry. No rash noted.   Psychiatric: He has a normal mood and affect. Judgment normal.   Vitals reviewed.          Assessment/Plan:   Assessment    1. Acute viral syndrome  - benzonatate (TESSALON) 100 MG Cap; Take 2 Caps by mouth 3 times a day as needed.  Dispense: 60 Cap; Refill: 0    2. Pharyngitis, unspecified etiology  - POCT Rapid Strep A  - maalox plus-benadryl-visc lidocaine (MAGIC MOUTHWASH); 10 ml swish, gargle and spit every 6 hours as needed for sore throat  Dispense: 120 mL; Refill: 0    3. Acute bacterial conjunctivitis of both eyes  - polymixin-trimethoprim (POLYTRIM) 19020-8.1  UNIT/ML-% Solution; Place 1 Drop in both eyes every 4 hours.  Dispense: 10 mL; Refill: 0      Rapid strep is negative.  Symptomatic, supportive care.  Increase fluids, rest.  Increase fluids, rest.  Patient may use salt water gargles, ice pops, cool fluids.  Patient is given Polytrim for conjunctivitis.  Printed information with patient education on pinkeye provided.  He is also given Tessalon Perles as needed for cough.  Note given off work.    Differential diagnosis, natural history, supportive care, and indications for immediate follow-up discussed.    If not improving in 3-5 days, F/U with PCP or return to  or sooner if worsens  Red flag warning symptoms and strict ER/follow-up precautions given.    Please note that this note was created using voice recognition speech to text software. Every effort has been made to correct obvious errors.  However, I expect there are errors of grammar and possibly context that were not discovered prior to finalizing the note    AVERY Shirley PA-C

## 2019-06-23 ENCOUNTER — OFFICE VISIT (OUTPATIENT)
Dept: URGENT CARE | Facility: MEDICAL CENTER | Age: 34
End: 2019-06-23
Payer: COMMERCIAL

## 2019-06-23 VITALS
TEMPERATURE: 97.1 F | HEART RATE: 102 BPM | OXYGEN SATURATION: 96 % | SYSTOLIC BLOOD PRESSURE: 110 MMHG | RESPIRATION RATE: 16 BRPM | HEIGHT: 69 IN | BODY MASS INDEX: 32.88 KG/M2 | WEIGHT: 222 LBS | DIASTOLIC BLOOD PRESSURE: 74 MMHG

## 2019-06-23 DIAGNOSIS — J02.9 VIRAL PHARYNGITIS: ICD-10-CM

## 2019-06-23 LAB
INT CON NEG: NEGATIVE
INT CON POS: POSITIVE
S PYO AG THROAT QL: NEGATIVE

## 2019-06-23 PROCEDURE — 87880 STREP A ASSAY W/OPTIC: CPT | Performed by: PHYSICIAN ASSISTANT

## 2019-06-23 PROCEDURE — 99213 OFFICE O/P EST LOW 20 MIN: CPT | Performed by: PHYSICIAN ASSISTANT

## 2019-06-23 ASSESSMENT — ENCOUNTER SYMPTOMS
SORE THROAT: 1
PALPITATIONS: 0
EYE DISCHARGE: 0
MYALGIAS: 0
HEADACHES: 0
VOMITING: 0
CONSTIPATION: 0
DIZZINESS: 0
ABDOMINAL PAIN: 0
EYE REDNESS: 0
CHILLS: 1
FEVER: 1
COUGH: 0
NAUSEA: 0
DIARRHEA: 0
SHORTNESS OF BREATH: 0
SINUS PAIN: 1
TROUBLE SWALLOWING: 1

## 2019-06-23 NOTE — PROGRESS NOTES
Subjective:      Jose Luong is a 34 y.o. male who presents with Pharyngitis (Sinus pressure/ headache/ x1day)       Pharyngitis     This is a new problem. The current episode started yesterday. The problem has been unchanged.  Neither side of throat is experiencing more pain than the other. Maximum temperature: Subjective fever last night.  The pain is moderate. Associated symptoms include congestion, a plugged ear sensation and trouble swallowing. Pertinent negatives include no abdominal pain, coughing, diarrhea, ear pain, headaches, shortness of breath or vomiting. He has had no exposure to strep or mono. He has tried NSAIDs for the symptoms. The treatment provided mild relief.       Review of Systems   Constitutional: Positive for chills, fever (Subjective fever last night) and malaise/fatigue.   HENT: Positive for congestion, sinus pain, sore throat and trouble swallowing. Negative for ear pain.    Eyes: Negative for discharge and redness.   Respiratory: Negative for cough and shortness of breath.    Cardiovascular: Negative for chest pain and palpitations.   Gastrointestinal: Negative for abdominal pain, constipation, diarrhea, nausea and vomiting.   Musculoskeletal: Negative for myalgias.   Skin: Negative for rash.   Neurological: Negative for dizziness and headaches.   Endo/Heme/Allergies: Positive for environmental allergies.       PMH:  has a past medical history of Ankle joint pain (2010); Anxiety; Depression; and Kidney stone (2013).  MEDS:   Current Outpatient Prescriptions:   •  polymixin-trimethoprim (POLYTRIM) 52577-7.1 UNIT/ML-% Solution, Place 1 Drop in both eyes every 4 hours. (Patient not taking: Reported on 6/23/2019), Disp: 10 mL, Rfl: 0  •  maalox plus-benadryl-visc lidocaine (MAGIC MOUTHWASH), 10 ml swish, gargle and spit every 6 hours as needed for sore throat (Patient not taking: Reported on 6/23/2019), Disp: 120 mL, Rfl: 0  •  benzonatate (TESSALON) 100 MG Cap, Take 2 Caps by mouth 3  "times a day as needed. (Patient not taking: Reported on 6/23/2019), Disp: 60 Cap, Rfl: 0  •  hydrOXYzine pamoate (VISTARIL) 25 MG Cap, Take 1 Cap by mouth 3 times a day as needed for Other (nausea). (Patient not taking: Reported on 6/23/2019), Disp: 60 Cap, Rfl: 0  •  fluoxetine (PROZAC) 40 MG capsule, Take 1 Cap by mouth every day. (Patient not taking: Reported on 6/23/2019), Disp: 90 Cap, Rfl: 1  ALLERGIES: No Known Allergies  SURGHX: No past surgical history on file.  SOCHX:  reports that he has never smoked. He has never used smokeless tobacco. He reports that he drinks alcohol. He reports that he uses drugs, including Marijuana.  FH: Family history was reviewed, no pertinent findings to report     Objective:     /74 (BP Location: Left arm, Patient Position: Sitting, BP Cuff Size: Adult)   Pulse (!) 102   Temp 36.2 °C (97.1 °F) (Temporal)   Resp 16   Ht 1.753 m (5' 9\")   Wt 100.7 kg (222 lb)   SpO2 96%   BMI 32.78 kg/m²       Physical Exam   Constitutional: He is oriented to person, place, and time. He appears well-developed and well-nourished.   HENT:   Head: Normocephalic and atraumatic.   Right Ear: Hearing, tympanic membrane, external ear and ear canal normal.   Left Ear: Hearing, tympanic membrane, external ear and ear canal normal.   Nose: Mucosal edema and rhinorrhea present. Right sinus exhibits frontal sinus tenderness (Mild). Right sinus exhibits no maxillary sinus tenderness. Left sinus exhibits frontal sinus tenderness (Mild). Left sinus exhibits no maxillary sinus tenderness.   Mouth/Throat: Uvula is midline, oropharynx is clear and moist and mucous membranes are normal.   Eyes: Pupils are equal, round, and reactive to light. Conjunctivae are normal.   Neck: Normal range of motion.   Cardiovascular: Normal rate, regular rhythm and normal heart sounds.    No murmur heard.  Pulmonary/Chest: Effort normal and breath sounds normal. He has no wheezes.   Lymphadenopathy:     He has cervical " adenopathy.   Neurological: He is alert and oriented to person, place, and time.   Skin: Skin is warm and dry. Capillary refill takes less than 2 seconds.   Psychiatric: He has a normal mood and affect. His behavior is normal. Judgment normal.   Vitals reviewed.      POCT Rapid Strep A - Negative     Assessment/Plan:     1. Viral pharyngitis  - POCT Rapid Strep A  - Supportive care discussed to include salt water gargles, throat lozenges, and increased fluid intake  - OTC cold/flu medications  - PO fluids  - Rest  - Tylenol or ibuprofen as needed for fever > 100.4 F        Differential Diagnosis, natural history, and supportive care discussed. Return to the Urgent Care or follow up with your PCP if symptoms fail to resolve, or for any new or worsening symptoms. Emergency room precautions discussed. Patient and/or family appears understanding of information.

## 2019-06-28 ENCOUNTER — OFFICE VISIT (OUTPATIENT)
Dept: URGENT CARE | Facility: MEDICAL CENTER | Age: 34
End: 2019-06-28
Payer: COMMERCIAL

## 2019-06-28 VITALS
HEIGHT: 69 IN | HEART RATE: 80 BPM | OXYGEN SATURATION: 97 % | BODY MASS INDEX: 33.18 KG/M2 | WEIGHT: 224 LBS | TEMPERATURE: 97.1 F | DIASTOLIC BLOOD PRESSURE: 70 MMHG | SYSTOLIC BLOOD PRESSURE: 108 MMHG

## 2019-06-28 DIAGNOSIS — H65.03 BILATERAL ACUTE SEROUS OTITIS MEDIA, RECURRENCE NOT SPECIFIED: ICD-10-CM

## 2019-06-28 PROCEDURE — 99214 OFFICE O/P EST MOD 30 MIN: CPT | Performed by: NURSE PRACTITIONER

## 2019-06-28 RX ORDER — AMOXICILLIN 500 MG/1
500 CAPSULE ORAL 3 TIMES DAILY
Qty: 30 CAP | Refills: 0 | Status: SHIPPED | OUTPATIENT
Start: 2019-06-28 | End: 2019-07-08

## 2019-06-28 ASSESSMENT — ENCOUNTER SYMPTOMS
COUGH: 1
HEADACHES: 0
NAUSEA: 0
CHILLS: 0
VOMITING: 0
DIZZINESS: 0
RHINORRHEA: 1
FEVER: 0

## 2019-06-28 NOTE — PROGRESS NOTES
Subjective:      Jose Luong is a 34 y.o. male who presents with Ear Fullness (feels plugged bilateral, rt ear has pain X2days)    Past Medical History:   Diagnosis Date   • Ankle joint pain 2010    tendon problem with left ankle. (declined surgery).    • Anxiety    • Depression    • Kidney stone 2013     Social History     Social History   • Marital status: Single     Spouse name: N/A   • Number of children: N/A   • Years of education: N/A     Occupational History   • battery manufacture [employer]     Social History Main Topics   • Smoking status: Never Smoker   • Smokeless tobacco: Never Used   • Alcohol use Yes      Comment: 1-2 per month   • Drug use: Yes     Types: Marijuana      Comment: occasional, for sleep   • Sexual activity: Yes     Partners: Female     Other Topics Concern   • Not on file     Social History Narrative    Daughter - born Dec. 1, 2015     Family History   Problem Relation Age of Onset   • Depression Mother    • Stroke Maternal Grandmother    • Alcohol abuse Maternal Grandmother    • Cancer Maternal Grandfather    • Diabetes Maternal Grandfather    • Heart Disease Paternal Grandfather    • Stroke Paternal Grandfather    • Diabetes Maternal Aunt    • Drug abuse Maternal Aunt        Allergies: Patient has no known allergies.    Patient is a 34-year-old male who presents today with complaint of bilateral ear pain, left greater than right.  States over the last week he had an upper respiratory infection with nasal congestion cough and mild sore throat.  He reports that the respiratory infection is starting to resolve, however approximately 3 to 4 days into his respiratory infection he began having bilateral ear pain that has increased since the onset along with decreased hearing.  He denies tinnitus.  Denies dizziness.          Otalgia    There is pain in both ears. This is a new problem. The current episode started in the past 7 days. The problem occurs constantly. The problem has been  "unchanged. There has been no fever. Associated symptoms include coughing, hearing loss and rhinorrhea. Pertinent negatives include no headaches or vomiting. He has tried nothing for the symptoms. The treatment provided no relief.       Review of Systems   Constitutional: Positive for malaise/fatigue. Negative for chills and fever.   HENT: Positive for congestion, ear pain, hearing loss and rhinorrhea. Negative for tinnitus.    Respiratory: Positive for cough.    Gastrointestinal: Negative for nausea and vomiting.   Neurological: Negative for dizziness and headaches.   All other systems reviewed and are negative.         Objective:     /70   Pulse 80   Temp 36.2 °C (97.1 °F)   Ht 1.753 m (5' 9\")   Wt 101.6 kg (224 lb)   SpO2 97%   BMI 33.08 kg/m²      Physical Exam   Constitutional: He is oriented to person, place, and time. He appears well-developed and well-nourished.   HENT:   Head: Normocephalic.   Nose: Nose normal.   Mouth/Throat: Oropharynx is clear and moist. No oropharyngeal exudate.   TMs red and retracted on both sides.  Positive preauricular tenderness on the right side, no preauricular tenderness on the left side.   Eyes: Pupils are equal, round, and reactive to light. Conjunctivae and EOM are normal.   Neck: Normal range of motion. Neck supple.   Cardiovascular: Normal rate, regular rhythm and normal heart sounds.    Pulmonary/Chest: Effort normal and breath sounds normal.   Musculoskeletal: Normal range of motion.   Neurological: He is alert and oriented to person, place, and time.   Skin: Skin is warm and dry.   Psychiatric: He has a normal mood and affect. His behavior is normal. Judgment and thought content normal.   Vitals reviewed.              Assessment/Plan:     1. Bilateral acute serous otitis media, recurrence not specified    - amoxicillin (AMOXIL) 500 MG Cap; Take 1 Cap by mouth 3 times a day for 10 days.  Dispense: 30 Cap; Refill: 0  -ibuprofen as needed  -OTC decongestant of " choice  -flonase  -warm compresses as needed  -follow up for persistent or worsening of symptoms.

## 2019-11-17 ENCOUNTER — OFFICE VISIT (OUTPATIENT)
Dept: URGENT CARE | Facility: MEDICAL CENTER | Age: 34
End: 2019-11-17
Payer: COMMERCIAL

## 2019-11-17 VITALS
OXYGEN SATURATION: 93 % | WEIGHT: 220.4 LBS | TEMPERATURE: 98 F | SYSTOLIC BLOOD PRESSURE: 118 MMHG | HEART RATE: 101 BPM | DIASTOLIC BLOOD PRESSURE: 80 MMHG | BODY MASS INDEX: 32.64 KG/M2 | HEIGHT: 69 IN

## 2019-11-17 DIAGNOSIS — J11.1 INFLUENZA-LIKE ILLNESS: ICD-10-CM

## 2019-11-17 DIAGNOSIS — B97.89 SORE THROAT (VIRAL): ICD-10-CM

## 2019-11-17 DIAGNOSIS — J02.8 SORE THROAT (VIRAL): ICD-10-CM

## 2019-11-17 DIAGNOSIS — R50.9 FEVER, UNSPECIFIED FEVER CAUSE: ICD-10-CM

## 2019-11-17 LAB
FLUAV+FLUBV AG SPEC QL IA: NEGATIVE
INT CON NEG: NORMAL
INT CON POS: NORMAL

## 2019-11-17 PROCEDURE — 87804 INFLUENZA ASSAY W/OPTIC: CPT | Performed by: PHYSICIAN ASSISTANT

## 2019-11-17 PROCEDURE — 99214 OFFICE O/P EST MOD 30 MIN: CPT | Performed by: PHYSICIAN ASSISTANT

## 2019-11-17 NOTE — LETTER
November 17, 2019         Patient: Jose Luong   YOB: 1985   Date of Visit: 11/17/2019           To Whom it May Concern:    Jose Luong was seen in my clinic on 11/17/2019. He may return to work on 11/18/2019.    If you have any questions or concerns, please don't hesitate to call.        Sincerely,           Darby Grewal P.A.-C.  Electronically Signed

## 2019-11-17 NOTE — PROGRESS NOTES
Subjective:      Jose Luong is a 34 y.o. male who presents with Pharyngitis (x2days, fever, body aches, sore throat)    PMH:  has a past medical history of Ankle joint pain (2010), Anxiety, Depression, and Kidney stone (2013).  MEDS:   Current Outpatient Medications:   •  Cetirizine HCl (ZYRTEC ALLERGY PO), Take  by mouth., Disp: , Rfl:   ALLERGIES: No Known Allergies  SURGHX: History reviewed. No pertinent surgical history.  SOCHX:  reports that he has never smoked. He has never used smokeless tobacco. He reports current alcohol use. He reports current drug use. Drug: Marijuana.  FH: Reviewed with patient, not pertinent to this visit.           Patient presents with:  Pharyngitis: x2days, fever, body aches, cough.  Exposure to the flu.       URI    This is a new problem. The current episode started yesterday. The problem has been rapidly worsening. The maximum temperature recorded prior to his arrival was 100.4 - 100.9 F. The fever has been present for less than 1 day. Associated symptoms include congestion, coughing, ear pain, headaches, rhinorrhea and a sore throat. Pertinent negatives include no chest pain, diarrhea, nausea, swollen glands, vomiting or wheezing. He has tried acetaminophen, increased fluids and NSAIDs for the symptoms. The treatment provided mild relief.       Review of Systems   Constitutional: Positive for chills, fever and malaise/fatigue.   HENT: Positive for congestion, ear pain, rhinorrhea and sore throat.    Eyes: Negative for discharge.   Respiratory: Positive for cough and sputum production. Negative for shortness of breath and wheezing.    Cardiovascular: Negative for chest pain.   Gastrointestinal: Negative for diarrhea, nausea and vomiting.   Musculoskeletal: Positive for myalgias.   Neurological: Positive for headaches.   All other systems reviewed and are negative.         Objective:     /80 (BP Location: Left arm, Patient Position: Sitting, BP Cuff Size: Adult)   Pulse  "(!) 101   Temp 36.7 °C (98 °F) (Temporal)   Ht 1.753 m (5' 9\")   Wt 100 kg (220 lb 6.4 oz)   SpO2 93%   BMI 32.55 kg/m²      Physical Exam  Vitals signs and nursing note reviewed.   Constitutional:       General: He is not in acute distress.     Appearance: He is well-developed. He is ill-appearing. He is not toxic-appearing.   HENT:      Head: Normocephalic and atraumatic.      Right Ear: Tympanic membrane normal.      Left Ear: Tympanic membrane normal.      Nose: Mucosal edema and rhinorrhea present.      Mouth/Throat:      Lips: Pink.      Mouth: Mucous membranes are moist.      Pharynx: Uvula midline. Posterior oropharyngeal erythema present.   Eyes:      General: Lids are normal.      Extraocular Movements: Extraocular movements intact.      Conjunctiva/sclera: Conjunctivae normal.      Pupils: Pupils are equal, round, and reactive to light.   Neck:      Musculoskeletal: Normal range of motion and neck supple.      Vascular: No JVD.      Trachea: Trachea normal.   Cardiovascular:      Rate and Rhythm: Regular rhythm. Tachycardia present.      Heart sounds: Normal heart sounds.   Pulmonary:      Effort: Pulmonary effort is normal.      Breath sounds: No rales.   Abdominal:      Palpations: Abdomen is soft.   Musculoskeletal: Normal range of motion.   Lymphadenopathy:      Cervical: No cervical adenopathy.   Skin:     General: Skin is warm and dry.      Capillary Refill: Capillary refill takes less than 2 seconds.   Neurological:      Mental Status: He is alert and oriented to person, place, and time.      Gait: Gait normal.   Psychiatric:         Mood and Affect: Mood normal.         Behavior: Behavior is cooperative.            Flu: neg     Assessment/Plan:     1. Influenza-like illness  POCT Influenza A/B   2. Fever, unspecified fever cause  POCT Influenza A/B   3. Sore throat (viral)  POCT Influenza A/B     PT likely has the flu despite negative flu test. This was discussed with patient (family), who " verbalized understanding of discussion.  Declined Tamiflu.     PT can continue OTC medications, increase fluids and rest until symptoms improve.     PT advised saltwater gargles/swishes  3-4 times daily until symptoms improve.       PT should follow up with PCP in 1-2 days for re-evaluation if symptoms have not improved.  Discussed red flags and reasons to return to UC or ED.  Pt and/or family verbalized understanding of diagnosis and follow up instructions and was offered informational handout on diagnosis.  PT discharged.

## 2019-11-22 ASSESSMENT — ENCOUNTER SYMPTOMS
SORE THROAT: 1
COUGH: 1
HEADACHES: 1
RHINORRHEA: 1
FEVER: 1
WHEEZING: 0
CHILLS: 1
DIARRHEA: 0
MYALGIAS: 1
SWOLLEN GLANDS: 0
NAUSEA: 0
VOMITING: 0
SPUTUM PRODUCTION: 1
EYE DISCHARGE: 0
SHORTNESS OF BREATH: 0

## 2020-01-25 ENCOUNTER — OFFICE VISIT (OUTPATIENT)
Dept: URGENT CARE | Facility: MEDICAL CENTER | Age: 35
End: 2020-01-25
Payer: COMMERCIAL

## 2020-01-25 VITALS
TEMPERATURE: 98.5 F | BODY MASS INDEX: 33.67 KG/M2 | WEIGHT: 228 LBS | RESPIRATION RATE: 18 BRPM | HEART RATE: 80 BPM | OXYGEN SATURATION: 96 % | DIASTOLIC BLOOD PRESSURE: 80 MMHG | SYSTOLIC BLOOD PRESSURE: 122 MMHG

## 2020-01-25 DIAGNOSIS — R10.9 FLANK PAIN: ICD-10-CM

## 2020-01-25 DIAGNOSIS — M54.9 ACUTE BACK PAIN, UNSPECIFIED BACK LOCATION, UNSPECIFIED BACK PAIN LATERALITY: ICD-10-CM

## 2020-01-25 DIAGNOSIS — Z87.442 HISTORY OF KIDNEY STONES: ICD-10-CM

## 2020-01-25 LAB
APPEARANCE UR: NORMAL
BILIRUB UR STRIP-MCNC: NEGATIVE MG/DL
COLOR UR AUTO: NORMAL
GLUCOSE UR STRIP.AUTO-MCNC: NEGATIVE MG/DL
KETONES UR STRIP.AUTO-MCNC: NEGATIVE MG/DL
LEUKOCYTE ESTERASE UR QL STRIP.AUTO: NEGATIVE
NITRITE UR QL STRIP.AUTO: NEGATIVE
PH UR STRIP.AUTO: 7 [PH] (ref 5–8)
PROT UR QL STRIP: NEGATIVE MG/DL
RBC UR QL AUTO: NEGATIVE
SP GR UR STRIP.AUTO: >=1.03
UROBILINOGEN UR STRIP-MCNC: 2 MG/DL

## 2020-01-25 PROCEDURE — 99214 OFFICE O/P EST MOD 30 MIN: CPT | Performed by: NURSE PRACTITIONER

## 2020-01-25 PROCEDURE — 81002 URINALYSIS NONAUTO W/O SCOPE: CPT | Performed by: NURSE PRACTITIONER

## 2020-01-25 RX ORDER — KETOROLAC TROMETHAMINE 30 MG/ML
30 INJECTION, SOLUTION INTRAMUSCULAR; INTRAVENOUS ONCE
Status: COMPLETED | OUTPATIENT
Start: 2020-01-25 | End: 2020-01-25

## 2020-01-25 RX ADMIN — KETOROLAC TROMETHAMINE 30 MG: 30 INJECTION, SOLUTION INTRAMUSCULAR; INTRAVENOUS at 16:29

## 2020-01-26 ASSESSMENT — ENCOUNTER SYMPTOMS
DIZZINESS: 0
DIARRHEA: 0
NAUSEA: 0
CONSTIPATION: 0
ABDOMINAL PAIN: 0
FEVER: 0
VOMITING: 0
TINGLING: 0
HEADACHES: 0

## 2020-01-26 ASSESSMENT — LIFESTYLE VARIABLES: SUBSTANCE_ABUSE: 0

## 2020-01-26 NOTE — PROGRESS NOTES
"Subjective:      Jose Luong is a 34 y.o. male who presents with Back Pain (side pain in back x2 days)    Reviewed past medical, surgical and family history. Reviewed prescription and OTC medications with patient in electronic health record today      No Known Allergies          HPI This is a new problem.  C/o  Not feelilng well, having pain in his side and back x 2 days. C/o 3 days ago \" something felt off - like I was getting sick\". Woke up 2 days ago with nausea , feeling cold. Then he developed right sided flank pain. Nausea improved.  Now having a dull, constant pain 3/10 with intermittent sharp pains. No change in urination.  Treatments tried: tylenol and ibuprofen ( 400 mg today). Helped a little.  Pain unchanged since onset. No other aggravating or alleviating factors.   PMHx: renal lithiasis      Review of Systems   Constitutional: Negative for fever and malaise/fatigue.   Gastrointestinal: Negative for abdominal pain, constipation, diarrhea, nausea and vomiting.   Genitourinary: Negative for dysuria, frequency, hematuria and urgency.   Neurological: Negative for dizziness, tingling and headaches.   Psychiatric/Behavioral: Negative for substance abuse.          Objective:     /80   Pulse 80   Temp 36.9 °C (98.5 °F) (Temporal)   Resp 18   Wt 103.4 kg (228 lb)   SpO2 96%   BMI 33.67 kg/m²      Physical Exam  Vitals signs reviewed.   HENT:      Head: Normocephalic.      Mouth/Throat:      Mouth: Mucous membranes are moist.   Neck:      Musculoskeletal: Normal range of motion.   Cardiovascular:      Rate and Rhythm: Normal rate and regular rhythm.      Pulses: Normal pulses.      Heart sounds: Normal heart sounds.   Pulmonary:      Effort: Pulmonary effort is normal.      Breath sounds: Normal breath sounds.   Abdominal:      Palpations: Abdomen is soft.      Tenderness: There is no tenderness. There is no right CVA tenderness, left CVA tenderness, guarding or rebound. Negative signs include " Boyd's sign and McBurney's sign.       Musculoskeletal: Normal range of motion.      Thoracic back: He exhibits tenderness. He exhibits normal range of motion, no bony tenderness, no swelling, no edema and no deformity.      Lumbar back: He exhibits tenderness. He exhibits normal range of motion, no bony tenderness, no swelling, no edema and no deformity.        Arms:    Skin:     General: Skin is warm.      Capillary Refill: Capillary refill takes less than 2 seconds.   Neurological:      Mental Status: He is alert and oriented to person, place, and time.   Psychiatric:         Mood and Affect: Mood normal.         Behavior: Behavior normal.         Thought Content: Thought content normal.             Results for orders placed or performed in visit on 01/25/20   POCT Urinalysis   Result Value Ref Range    POC Color dark yellow Negative    POC Appearance cloudy Negative    POC Leukocyte Esterase negative Negative    POC Nitrites negative Negative    POC Urobiligen 2.0 Negative (0.2) mg/dL    POC Protein negative Negative mg/dL    POC Urine PH 7.0 5.0 - 8.0    POC Blood negative Negative    POC Specific Gravity >=1.030 <1.005 - >1.030    POC Ketones negative Negative mg/dL    POC Bilirubin negative Negative mg/dL    POC Glucose negative Negative mg/dL            Assessment/Plan:     1. Acute back pain, unspecified back location, unspecified back pain laterality  POCT Urinalysis    ketorolac (TORADOL) injection 30 mg   2. History of kidney stones  ketorolac (TORADOL) injection 30 mg   3. Flank pain  ketorolac (TORADOL) injection 30 mg       OTC  analgesic of choice. Follow manufactures dosing and safety precautions.     Toradol given in clinic today. Tolerated well without adverse effects. Advised not to take NSAIDS for 6-8 hours post injection.     OTC acetaminophen for breakthrough pain. Dosage and directions per . Do not exceed 3000 mg in 24 hours.     Keep well hydrated      Family history review  with pt and not pertinent to today's problem.         All medications and /or  Treatments that are ordered in this encounter were administered or done by provider

## 2020-10-09 ENCOUNTER — APPOINTMENT (OUTPATIENT)
Dept: RADIOLOGY | Facility: IMAGING CENTER | Age: 35
End: 2020-10-09
Attending: NURSE PRACTITIONER
Payer: COMMERCIAL

## 2020-10-09 ENCOUNTER — OCCUPATIONAL MEDICINE (OUTPATIENT)
Dept: URGENT CARE | Facility: CLINIC | Age: 35
End: 2020-10-09
Payer: COMMERCIAL

## 2020-10-09 DIAGNOSIS — M25.572 ACUTE LEFT ANKLE PAIN: ICD-10-CM

## 2020-10-09 DIAGNOSIS — S93.402A SPRAIN OF LEFT ANKLE, UNSPECIFIED LIGAMENT, INITIAL ENCOUNTER: ICD-10-CM

## 2020-10-09 PROCEDURE — 99213 OFFICE O/P EST LOW 20 MIN: CPT | Mod: 29 | Performed by: NURSE PRACTITIONER

## 2020-10-09 PROCEDURE — 73610 X-RAY EXAM OF ANKLE: CPT | Mod: TC,FY,LT,29 | Performed by: NURSE PRACTITIONER

## 2020-10-09 ASSESSMENT — ENCOUNTER SYMPTOMS
MYALGIAS: 1
FOCAL WEAKNESS: 0
SENSORY CHANGE: 0
TINGLING: 0
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
## 2020-10-13 ENCOUNTER — OCCUPATIONAL MEDICINE (OUTPATIENT)
Dept: URGENT CARE | Facility: CLINIC | Age: 35
End: 2020-10-13
Payer: COMMERCIAL

## 2020-10-13 VITALS
HEART RATE: 86 BPM | TEMPERATURE: 98.9 F | SYSTOLIC BLOOD PRESSURE: 124 MMHG | BODY MASS INDEX: 33.03 KG/M2 | RESPIRATION RATE: 14 BRPM | WEIGHT: 223 LBS | OXYGEN SATURATION: 98 % | DIASTOLIC BLOOD PRESSURE: 80 MMHG | HEIGHT: 69 IN

## 2020-10-13 DIAGNOSIS — S93.402D SPRAIN OF LEFT ANKLE, UNSPECIFIED LIGAMENT, SUBSEQUENT ENCOUNTER: ICD-10-CM

## 2020-10-13 PROCEDURE — 99213 OFFICE O/P EST LOW 20 MIN: CPT | Performed by: PHYSICIAN ASSISTANT

## 2020-10-13 ASSESSMENT — ENCOUNTER SYMPTOMS
FEVER: 0
SHORTNESS OF BREATH: 0
DIARRHEA: 0
CHILLS: 0
VOMITING: 0
DIZZINESS: 0
NAUSEA: 0
ABDOMINAL PAIN: 0
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
## 2020-10-13 NOTE — LETTER
"   [facility]  [address] - [place] [address]  Phone:  [phone] - Fax:  [phone]   [facility] Progress Report and Disability Certification  Date of Service: 10/13/2020   No Show:  No  Date / Time of Next Visit:     Claim Information   Patient Name: Jose Luong  Claim Number:     Employer: [employer]  Date of Injury: 10/8/2020     Insurer / TPA: [employer] ID / SSN:     Occupation: Warranty Advisor  Diagnosis: The encounter diagnosis was Sprain of left ankle, unspecified ligament, subsequent encounter.    Medical Information   Related to Industrial Injury? Yes    Subjective Complaints:  DOI: 10/8/20. 35 year old male with left ankle injury after landing on foot wrong while running across street. He had no pain at rest but describes pain at a 6-8/10 with some weight bearing. Had some swelling laterally and pain at achilles as well. Denies distal paresthesia or focal weakness. No second job. He does report a prior left ankle injury about 10 years ago where he \"tore all the tendons\" in the foot, but was unable to undergo surgery due to lack of insurance. He performed physical therapy but states he's always had some residual pain from the incident. He returns for follow up today and reports significant improvement in the pain and swelling of the left foot and ankle. Pain has almost completely resolved, and he only took OTC tylenol a few times for pain. He has been tolerating full duty well.    Objective Findings: Musculoskeletal: Normal range of motion.      Left ankle: He exhibits normal range of motion, no swelling, no ecchymosis and no deformity. No tenderness. No lateral malleolus and no medial malleolus tenderness found. Achilles tendon exhibits no pain.    Pre-Existing Condition(s): Prior left ankle injury 10 years ago    Assessment:   Condition Improved    Status: Discharged /  MMI  Permanent Disability:No    Plan:   "    Diagnostics: X-ray  Comments:negative     Comments:       Disability Information   Status: Released to Full Duty    From:     Through:   Restrictions are:     Physical Restrictions   Sitting:    Standing:    Stooping:    Bending:      Squatting:    Walking:    Climbing:    Pushing:      Pulling:    Other:    Reaching Above Shoulder (L):   Reaching Above Shoulder (R):       Reaching Below Shoulder (L):    Reaching Below Shoulder (R):      Not to exceed Weight Limits   Carrying(hrs):   Weight Limit(lb):   Lifting(hrs):   Weight  Limit(lb):     Comments: Patient's pain has almost completely resolved  Tolerating full duty well  Discharged/MMI today    Repetitive Actions   Hands: i.e. Fine Manipulations from Grasping:     Feet: i.e. Operating Foot Controls:     Driving / Operate Machinery:     Provider Name:   Amber Saul P.A.-C. Physician Signature:  Physician Name:     Clinic Name / Location: [address] [place] [address] [place] [address] Clinic Phone Number: Dept: [phone]   Appointment Time: 8:30 Am Visit Start Time: 8:24 AM   Check-In Time:  8:21 Am Visit Discharge Time:  9:01 AM   Original-Treating Physician or Chiropractor    Page 2-Insurer/TPA    Page 3-Employer    Page 4-Employee

## 2021-06-30 ENCOUNTER — OFFICE VISIT (OUTPATIENT)
Dept: URGENT CARE | Facility: CLINIC | Age: 36
End: 2021-06-30
Payer: COMMERCIAL

## 2021-06-30 VITALS
TEMPERATURE: 98.2 F | WEIGHT: 220 LBS | RESPIRATION RATE: 16 BRPM | HEART RATE: 64 BPM | OXYGEN SATURATION: 98 % | HEIGHT: 69 IN | BODY MASS INDEX: 32.58 KG/M2 | SYSTOLIC BLOOD PRESSURE: 108 MMHG | DIASTOLIC BLOOD PRESSURE: 64 MMHG

## 2021-06-30 DIAGNOSIS — J06.9 VIRAL URI: ICD-10-CM

## 2021-06-30 PROCEDURE — 99213 OFFICE O/P EST LOW 20 MIN: CPT | Performed by: NURSE PRACTITIONER

## 2021-06-30 ASSESSMENT — ENCOUNTER SYMPTOMS
HEADACHES: 1
COUGH: 0
SORE THROAT: 1

## 2021-06-30 NOTE — LETTER
June 30, 2021    To Whom It May Concern:         This is confirmation that Jose Luong attended his scheduled appointment with MARGARITA Melgoza on 6/30/21.    Please excuse him from work 6/30/21-7/2/21.  Sincerely,          RICARDO Melgoza.  [phone]

## 2021-06-30 NOTE — PROGRESS NOTES
Subjective:      Jose Luong is a 36 y.o. male who presents with Other (x 1 day, started with a sore throat, cloudy eye sight believed from looking at the computer screen too much, green nasal discharge, concerned about head pressure)            Pharyngitis   This is a new problem. Episode onset: pt reports new onset of ST, nasal congestion, post nasal drip and green nasal discharge that started last night. Reports HA is getting worse, +photophobia today. There has been no fever. Associated symptoms include congestion and headaches. Pertinent negatives include no coughing. Associated symptoms comments: Denies any recent sick contacts. He has tried nothing for the symptoms.       Review of Systems   HENT: Positive for congestion and sore throat.    Respiratory: Negative for cough.    Neurological: Positive for headaches.   All other systems reviewed and are negative.    Past Medical History:   Diagnosis Date   • Ankle joint pain 2010    tendon problem with left ankle. (declined surgery).    • Anxiety    • Depression    • Kidney stone 2013    History reviewed. No pertinent surgical history.   Social History     Socioeconomic History   • Marital status: Single     Spouse name: Not on file   • Number of children: Not on file   • Years of education: Not on file   • Highest education level: Not on file   Occupational History   • Occupation: [employer]     Employer: [employer]   Tobacco Use   • Smoking status: Never Smoker   • Smokeless tobacco: Never Used   Substance and Sexual Activity   • Alcohol use: Not Currently     Comment: 1-2 per month   • Drug use: Yes     Types: Marijuana     Comment: occasional, for sleep   • Sexual activity: Yes     Partners: Female   Other Topics Concern   • Not on file   Social History Narrative    Daughter - born Dec. 1, 2015     Social Determinants of Health     Financial Resource Strain:    • Difficulty of Paying Living Expenses:    Food Insecurity:    • Worried About Running  "Out of Food in the Last Year:    • Ran Out of Food in the Last Year:    Transportation Needs:    • Lack of Transportation (Medical):    • Lack of Transportation (Non-Medical):    Physical Activity:    • Days of Exercise per Week:    • Minutes of Exercise per Session:    Stress:    • Feeling of Stress :    Social Connections:    • Frequency of Communication with Friends and Family:    • Frequency of Social Gatherings with Friends and Family:    • Attends Shinto Services:    • Active Member of Clubs or Organizations:    • Attends Club or Organization Meetings:    • Marital Status:    Intimate Partner Violence:    • Fear of Current or Ex-Partner:    • Emotionally Abused:    • Physically Abused:    • Sexually Abused:           Objective:     /64 (BP Location: Left arm, Patient Position: Sitting, BP Cuff Size: Adult long)   Pulse 64   Temp 36.8 °C (98.2 °F) (Oral)   Resp 16   Ht 1.753 m (5' 9\")   Wt 99.8 kg (220 lb)   SpO2 98%   BMI 32.49 kg/m²      Physical Exam  Vitals and nursing note reviewed.   Constitutional:       Appearance: Normal appearance.   HENT:      Head: Normocephalic and atraumatic.      Right Ear: Tympanic membrane and external ear normal.      Left Ear: Tympanic membrane and external ear normal.      Nose: Congestion and rhinorrhea present.      Mouth/Throat:      Mouth: Mucous membranes are moist.      Pharynx: Oropharynx is clear.   Eyes:      Extraocular Movements: Extraocular movements intact.      Pupils: Pupils are equal, round, and reactive to light.   Cardiovascular:      Rate and Rhythm: Normal rate and regular rhythm.   Pulmonary:      Effort: Pulmonary effort is normal.      Breath sounds: Normal breath sounds.   Musculoskeletal:         General: Normal range of motion.      Cervical back: Normal range of motion and neck supple.   Skin:     General: Skin is warm and dry.      Capillary Refill: Capillary refill takes less than 2 seconds.   Neurological:      General: No focal " deficit present.      Mental Status: He is alert and oriented to person, place, and time. Mental status is at baseline.   Psychiatric:         Mood and Affect: Mood normal.         Speech: Speech normal.         Thought Content: Thought content normal.         Judgment: Judgment normal.                        Assessment/Plan:        1. Viral URI  Discussed with patient symptoms are viral in nature, there is low concern for any respiratory infection currently. Antibiotics are not advised at this time.  Warm salt water gargles  Alternate tylenol and ibuprofen for pain  Soft foods and cool liquids  Throat lozenges as directed  OTC cold medication to help alleviate symptoms  Encouraged rest and fluids  Work note provided  Supportive care, differential diagnoses, and indications for immediate follow-up discussed with patient.    Pathogenesis of diagnosis discussed including typical length and natural progression.      Instructed to return to  or nearest emergency department if symptoms fail to improve, for any change in condition, further concerns, or new concerning symptoms.  Patient states understanding of the plan of care and discharge instructions.

## 2021-10-28 NOTE — MR AVS SNAPSHOT
"        Jose Luong   2017 2:00 PM   Office Visit   MRN: [id number]    Department:  [facility] - Internal Med   Dept Phone:  [phone]    Description:  Male : 1985   Provider:  Christofer Sykes M.D.           Reason for Visit     Establish Care check up    Depression sleep problems      Allergies as of 2017     No Known Allergies      You were diagnosed with     Obesity (BMI 30-39.9)   [623614]       Family history of diabetes mellitus (DM)   [728062]       Depression, unspecified depression type   [5987363]       Screening for diabetes mellitus (DM)   [768836]         Vital Signs     Blood Pressure Pulse Temperature Height Weight Body Mass Index    118/80 mmHg 77 36.2 °C (97.2 °F) 1.753 m (5' 9\") 97.977 kg (216 lb) 31.88 kg/m2    Oxygen Saturation Smoking Status                95% Never Smoker           Basic Information     Date Of Birth Sex Race Ethnicity Preferred Language    1985 Male White Non- English      Your appointments     Sep 07, 2017  1:15 PM   Established Patient with Christofer Sykes M.D.   [facility] - Internal Medicine (--)    [address] [place] [id number] [phone]           You will be receiving a confirmation call a few days before your appointment from our automated call confirmation system.              Problem List              ICD-10-CM Priority Class Noted - Resolved    Obesity (BMI 30-39.9) E66.9   2017 - Present    Depression F32.9   2017 - Present      Health Maintenance        Date Due Completion Dates    IMM DTaP/Tdap/Td Vaccine (1 - Tdap) 2005 ---    IMM INFLUENZA (1) 2017 ---            Current Immunizations     No immunizations on file.      Below and/or attached are the medications your provider expects you to take. Review all of your home medications and newly ordered medications with your provider and/or pharmacist. Follow medication instructions as directed by your provider and/or " pharmacist. Please keep your medication list with you and share with your provider. Update the information when medications are discontinued, doses are changed, or new medications (including over-the-counter products) are added; and carry medication information at all times in the event of emergency situations     Allergies:  No Known Allergies          Medications  Valid as of: August 07, 2017 -  4:15 PM    Generic Name Brand Name Tablet Size Instructions for use    FLUoxetine HCl (Cap) PROZAC 20 MG Take 1 Cap by mouth every day.        .                 Medicines prescribed today were sent to:     [facility] [address] [place] [address], [place] - [address]    [address] [place] [address]    Phone: [phone] Fax: [phone]    Open 24 Hours?: No      Medication refill instructions:       If your prescription bottle indicates you have medication refills left, it is not necessary to call your provider’s office. Please contact your pharmacy and they will refill your medication.    If your prescription bottle indicates you do not have any refills left, you may request refills at any time through one of the following ways: The online [employer] system (except Urgent Care), by calling your provider’s office, or by asking your pharmacy to contact your provider’s office with a refill request. Medication refills are processed only during regular business hours and may not be available until the next business day. Your provider may request additional information or to have a follow-up visit with you prior to refilling your medication.   *Please Note: Medication refills are assigned a new Rx number when refilled electronically. Your pharmacy may indicate that no refills were authorized even though a new prescription for the same medication is available at the pharmacy. Please request the medicine by name with the pharmacy before contacting your provider for a refill.        Your To Do List     Future Labs/Procedures  Complete By Expires    CBC WITH DIFFERENTIAL  As directed 8/7/2018    COMP METABOLIC PANEL  As directed 8/7/2018    HEMOGLOBIN A1C  As directed 8/7/2018      Referral     A referral request has been sent to our patient care coordination department. Please allow 3-5 business days for us to process this request and contact you either by phone or mail. If you do not hear from us by the 5th business day, please call us at [phone].        Instructions    Please take fluoxetine (Prozac) 20 mg, once daily.  (Sent to your pharmacy).   Please stop by the lab in the morning (before eating) for some blood work (within the next week or so).   Please follow-up with this clinic in about 4 weeks (for depression).           Mutations Studiot Access Code: Activation code not generated  Current Hunch Status: Active           Treat and Release